# Patient Record
Sex: FEMALE | Race: WHITE | NOT HISPANIC OR LATINO | Employment: FULL TIME | URBAN - METROPOLITAN AREA
[De-identification: names, ages, dates, MRNs, and addresses within clinical notes are randomized per-mention and may not be internally consistent; named-entity substitution may affect disease eponyms.]

---

## 2017-08-22 ENCOUNTER — ALLSCRIPTS OFFICE VISIT (OUTPATIENT)
Dept: OTHER | Facility: OTHER | Age: 62
End: 2017-08-22

## 2017-08-22 DIAGNOSIS — Z01.419 ENCOUNTER FOR GYNECOLOGICAL EXAMINATION WITHOUT ABNORMAL FINDING: ICD-10-CM

## 2017-08-22 PROCEDURE — 87624 HPV HI-RISK TYP POOLED RSLT: CPT | Performed by: OBSTETRICS & GYNECOLOGY

## 2017-08-22 PROCEDURE — G0145 SCR C/V CYTO,THINLAYER,RESCR: HCPCS | Performed by: OBSTETRICS & GYNECOLOGY

## 2017-08-23 ENCOUNTER — LAB REQUISITION (OUTPATIENT)
Dept: LAB | Facility: HOSPITAL | Age: 62
End: 2017-08-23
Payer: COMMERCIAL

## 2017-08-23 DIAGNOSIS — Z01.419 ENCOUNTER FOR GYNECOLOGICAL EXAMINATION WITHOUT ABNORMAL FINDING: ICD-10-CM

## 2017-08-25 LAB — HPV RRNA GENITAL QL NAA+PROBE: NORMAL

## 2017-08-29 LAB
LAB AP GYN PRIMARY INTERPRETATION: NORMAL
Lab: NORMAL

## 2017-09-28 DIAGNOSIS — Z12.31 ENCOUNTER FOR SCREENING MAMMOGRAM FOR MALIGNANT NEOPLASM OF BREAST: ICD-10-CM

## 2017-12-01 ENCOUNTER — GENERIC CONVERSION - ENCOUNTER (OUTPATIENT)
Dept: OTHER | Facility: OTHER | Age: 62
End: 2017-12-01

## 2018-01-14 VITALS
BODY MASS INDEX: 32.92 KG/M2 | SYSTOLIC BLOOD PRESSURE: 120 MMHG | DIASTOLIC BLOOD PRESSURE: 70 MMHG | WEIGHT: 174.38 LBS | HEIGHT: 61 IN

## 2018-01-23 NOTE — MISCELLANEOUS
Message   Date: 01 Dec 2017 2:38 PM EST, Recorded By: Bartolo Nolan For: Asaf Edgar: Pankaj Malik, Self   Phone: [phone] [facility]), [phone] [id number] (Work)   Reason: Medical Complaint   Patient called c/o might be getting a UTI  Wants Rx just in case  Advised call PCP  Active Problems    1  Atrophy of vagina (627 3) (N95 2)   2  Breast self examination education, encounter for (V65 49) (Z71 89)   3  Cervical cancer screening (V76 2) (Z12 4)   4  Encounter for annual routine gynecological examination (V72 31) (Z01 419)   5  Encounter for routine gynecological examination with Papanicolaou smear of cervix   (V72 31,V76 2) (Z01 419)   6  Encounter for screening mammogram for malignant neoplasm of breast (V76 12)   (Z12 31)   7  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 (760 76) (Z91 89)   8  History of self breast exam   9  Osteoporosis (733 00) (M81 0)   10  Screening for human papillomavirus (HPV) (V73 81) (Z11 51)   11  Visit for screening mammogram (V76 12) (Z12 31)   12  Visit For: Single System Exam Gynecological (V72 31)   13  Vitamin D deficiency (268 9) (E55 9)    Current Meds   1  Abilify 5 MG Oral Tablet (ARIPiprazole); Therapy: [id number] to (Last Dave Bur)  Requested for: 23Apr2011 Ordered   2  Calcium 600/Vitamin D3 TABS; Therapy: (Recorded:04Nov2014) to Recorded   3  PROzac 20 MG Oral Capsule (FLUoxetine HCl); Therapy: (Recorded:[id number]) to Recorded   4  Vitamin D 97556 U CAPS; Therapy: (Recorded:04Nov2014) to Recorded    Allergies    1   Demerol SOLN    Signatures   Electronically signed by : Kristine Murry, ; Dec  1 2017  2:39PM EST                       (Author)

## 2018-09-11 ENCOUNTER — ANNUAL EXAM (OUTPATIENT)
Dept: OBGYN CLINIC | Facility: CLINIC | Age: 63
End: 2018-09-11
Payer: COMMERCIAL

## 2018-09-11 VITALS
SYSTOLIC BLOOD PRESSURE: 118 MMHG | DIASTOLIC BLOOD PRESSURE: 70 MMHG | WEIGHT: 181 LBS | BODY MASS INDEX: 33.31 KG/M2 | HEIGHT: 62 IN

## 2018-09-11 DIAGNOSIS — Z91.89 HISTORY OF DES EXPOSURE IN UTERO: ICD-10-CM

## 2018-09-11 DIAGNOSIS — Z01.419 WOMEN'S ANNUAL ROUTINE GYNECOLOGICAL EXAMINATION: ICD-10-CM

## 2018-09-11 DIAGNOSIS — Z12.31 VISIT FOR SCREENING MAMMOGRAM: ICD-10-CM

## 2018-09-11 DIAGNOSIS — M85.89 OSTEOPENIA OF MULTIPLE SITES: ICD-10-CM

## 2018-09-11 DIAGNOSIS — N95.2 ATROPHY OF VAGINA: ICD-10-CM

## 2018-09-11 DIAGNOSIS — Z11.51 SCREENING FOR HUMAN PAPILLOMAVIRUS (HPV): ICD-10-CM

## 2018-09-11 DIAGNOSIS — Z12.4 SCREENING FOR CERVICAL CANCER: Primary | ICD-10-CM

## 2018-09-11 PROCEDURE — 99396 PREV VISIT EST AGE 40-64: CPT | Performed by: OBSTETRICS & GYNECOLOGY

## 2018-09-11 PROCEDURE — 87624 HPV HI-RISK TYP POOLED RSLT: CPT | Performed by: OBSTETRICS & GYNECOLOGY

## 2018-09-11 PROCEDURE — G0145 SCR C/V CYTO,THINLAYER,RESCR: HCPCS | Performed by: OBSTETRICS & GYNECOLOGY

## 2018-09-11 RX ORDER — ZOLEDRONIC ACID 5 MG/100ML
5 INJECTION, SOLUTION INTRAVENOUS
COMMUNITY
Start: 2015-02-04

## 2018-09-11 RX ORDER — OMEPRAZOLE 20 MG/1
CAPSULE, DELAYED RELEASE ORAL
COMMUNITY
Start: 2018-08-11

## 2018-09-11 RX ORDER — HYALURONATE SODIUM 10 MG/ML
2 SYRINGE (ML) INTRAARTICULAR
COMMUNITY
End: 2018-10-02 | Stop reason: ALTCHOICE

## 2018-09-11 RX ORDER — DEXTROAMPHETAMINE SACCHARATE, AMPHETAMINE ASPARTATE, DEXTROAMPHETAMINE SULFATE AND AMPHETAMINE SULFATE 2.5; 2.5; 2.5; 2.5 MG/1; MG/1; MG/1; MG/1
TABLET ORAL
COMMUNITY
End: 2018-10-02 | Stop reason: SDUPTHER

## 2018-09-11 RX ORDER — FLUOXETINE HYDROCHLORIDE 40 MG/1
40 CAPSULE ORAL
COMMUNITY
Start: 2018-05-30

## 2018-09-11 RX ORDER — ARIPIPRAZOLE 1 MG/ML
SOLUTION ORAL
COMMUNITY

## 2018-09-11 NOTE — PROGRESS NOTES
Assessment/Plan:  1  Yearly exam-Pap smear done, self-breast awareness reviewed, calcium/vitamin-D recommendations discussed, mammogram request given, colonoscopy as per specialist   2   History of osteoporosis/osteopenia/vitamin-D deficiency-patient was previously treated with vitamin-D 32845 units weekly and is status post Reclast a few injections by her report  She is followed by Endocrinology at [address] [place] [address] scan September 2016 demonstrated osteopenia with T-score -2 4 at the femoral neck  She does have follow-up with endocrinology in the near future  Would suggest DEXA scan soon  3   History of 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-Pap smear was done today  Disposition as per findings  She does have a distant history of abnormal Pap smear but no recent history  4   Atrophic vagina-patient is without symptoms  5   Other-her daughter Elijah Suarez, former patient of mine is status post LEEP procedure for abnormal Pap smear in [place]  She has had follow-up with no residual findings  She is to get  in 2019  My congratulations were given  She will follow-up in 1 year or as needed  No problem-specific Assessment & Plan notes found for this encounter  Diagnoses and all orders for this visit:    Screening for cervical cancer  -     Liquid-based pap, screening    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  -     Liquid-based pap, screening    Screening for human papillomavirus (HPV)  -     Liquid-based pap, screening    Atrophy of vagina    Osteopenia of multiple sites    Women's annual routine gynecological examination    Visit for screening mammogram  -     Mammo screening bilateral w 3d & cad; Future    Other orders  -     ARIPiprazole (ABILIFY) 1 mg/mL oral solution; Abilify  -     amphetamine-dextroamphetamine (ADDERALL, 10MG,) 10 mg tablet;  Adderall  -     Calcium Carb-Cholecalciferol 600-800 MG-UNIT TABS; Take by mouth  -     Sodium Hyaluronate (EUFLEXXA) 20 MG/2ML SOSY; 2 mL  -     FLUoxetine (PROzac) 40 MG capsule; Take 40 mg by mouth  -     omeprazole (PriLOSEC) 20 mg delayed release capsule;   -     zoledronic acid (RECLAST) 5 mg/100 mL IV infusion (premix); Infuse 5 mg/100 mL into a venous catheter          Subjective:      Patient ID: Cruz Khan is a 66 y o  female  Patient was seen today for yearly exam   Please see assessment plan for details  The following portions of the patient's history were reviewed and updated as appropriate: allergies, current medications, past family history, past medical history, past social history, past surgical history and problem list     Review of Systems   Constitutional: Negative for chills, diaphoresis, fatigue and fever  Respiratory: Negative for apnea, cough, chest tightness, shortness of breath and wheezing  Cardiovascular: Negative for chest pain, palpitations and leg swelling  Gastrointestinal: Negative for abdominal distention, abdominal pain, anal bleeding, constipation, diarrhea, nausea, rectal pain and vomiting  Genitourinary: Negative for difficulty urinating, dyspareunia, dysuria, frequency, hematuria, menstrual problem, pelvic pain, urgency, vaginal bleeding, vaginal discharge and vaginal pain  Musculoskeletal: Negative for arthralgias, back pain and myalgias  Skin: Negative for color change and rash  Neurological: Negative for dizziness, syncope, light-headedness, numbness and headaches  Hematological: Negative for adenopathy  Does not bruise/bleed easily  Psychiatric/Behavioral: Negative for dysphoric mood and sleep disturbance  The patient is not nervous/anxious  Objective:      /70 (BP Location: Left arm, Patient Position: Sitting, Cuff Size: Standard)   Ht 5' 2" (1 575 m)   Wt 82 1 kg (181 lb)   Breastfeeding?  No   BMI 33 11 kg/m²          Physical Exam    Objective      /70 (BP Location: Left arm, Patient Position: Sitting, Cuff Size: Standard)   Ht 5' 2" (1 575 m)   Wt 82 1 kg (181 lb) Breastfeeding? No   BMI 33 11 kg/m²     General:   alert and oriented, in no acute distress, alert, appears stated age and cooperative   Neck: normal to inspection and palpation   Breast: normal appearance, no masses or tenderness   Heart:    Lungs:    Abdomen: soft, non-tender, without masses or organomegaly   Vulva: normal   Vagina: Mildly atrophic, without erythema or lesions or discharge  Normal   Cervix: Mildly atrophic, without lesions or discharge or cervicitis    No Cervical motion tenderness and normal   Uterus: top normal size, anteverted, non-tender   Adnexa: no mass, fullness, tenderness   Rectum: negative

## 2018-09-11 NOTE — PATIENT INSTRUCTIONS
Vaginal Atrophy   WHAT YOU NEED TO KNOW:   What is vaginal atrophy? Vaginal atrophy is a condition that causes thinning, drying, and inflammation of vaginal tissue  This condition is caused by decreased levels of estrogen (a female sex hormone)  Vaginal atrophy can increase your risk for vaginal and urinary tract infections  Vaginal atrophy can worsen over time if not treated  What causes or increases your risk of vaginal atrophy? · Menopause     · Medicines that lower your estrogen levels, such as those used to treat breast cancer, endometriosis, or fibroids    · Radiation to your pelvic area     · Surgery to remove the ovaries    · Breastfeeding  What are the signs and symptoms of vaginal atrophy? · Vaginal dryness, itching, and burning    · Vaginal discharge    · Pain or discomfort during sex    · Light bleeding after sex    · Burning during urination    · Frequent, sudden, strong urges to urinate    · Urinary incontinence (loss of control of your bladder)  How is vaginal atrophy diagnosed? Your healthcare provider will ask about your symptoms  A pelvic exam will be done to examine your vagina and cervix  Your healthcare provider will place a speculum into your vagina to open and examine it  A sample of discharge from your vagina may be collected and tested  A urine test may also be done  How is vaginal atrophy treated? · Over-the counter vaginal moisturizers  can help reduce dryness  Your healthcare provider may recommend that you use a vaginal moisturizer several times each week and during sex  Only use creams that are made for vaginal use  Do  not  use petroleum jelly  Lubricants can be used during sex to decrease pain and discomfort  · Estrogen  may help decrease dryness  It may also lower your risk of vaginal infections if you are going through menopause  It can also help to relieve urinary symptoms  Estrogen may be prescribed in the form of a cream, tablet, or ring   These medicines can be applied or inserted into the vagina  Estrogen can also be prescribed in the form of a pill  When should I contact my healthcare provider? · You have a foul-smelling odor coming from your vagina  · You have a thick, cheese-like discharge from your vagina  · You have itching, swelling, or redness in your vagina  · You have pain or burning when you urinate  · Your urine smells bad  · Your symptoms do not improve, or they get worse  · You have questions or concerns about your condition or care  CARE AGREEMENT:   You have the right to help plan your care  Learn about your health condition and how it may be treated  Discuss treatment options with your caregivers to decide what care you want to receive  You always have the right to refuse treatment  The above information is an  only  It is not intended as medical advice for individual conditions or treatments  Talk to your doctor, nurse or pharmacist before following any medical regimen to see if it is safe and effective for you  © 2017 [employer] Information is for End User's use only and may not be sold, redistributed or otherwise used for commercial purposes  All illustrations and images included in CareNotes® are the copyrighted property of A D A M , Inc  or Soham Lewis

## 2018-09-13 LAB
HPV HR 12 DNA CVX QL NAA+PROBE: NEGATIVE
HPV16 DNA CVX QL NAA+PROBE: NEGATIVE
HPV18 DNA CVX QL NAA+PROBE: NEGATIVE
LAB AP GYN PRIMARY INTERPRETATION: NORMAL
Lab: NORMAL

## 2018-09-14 ENCOUNTER — TELEPHONE (OUTPATIENT)
Dept: OBGYN CLINIC | Facility: CLINIC | Age: 63
End: 2018-09-14

## 2018-10-01 NOTE — PROGRESS NOTES
Assessment/Plan:    Class 1 obesity  -Discussed options of [facility]-Intensive Lifestyle Intervention Program, Very Low Calorie Diet-VLCD and Conservative Program and the role of weight loss medications   -Not a candidate for sympathomimetics as she is currently taking Adderall  -Can consider Contrave, which may also help with smoking cessation  -Initial weight loss goal of 5-10% weight loss for improved health  -Screening labs: Check A1c, insulin, TSH, LP, CMP  She will have labs done within the first two weeks of starting VLCD- within acceptable limits from 09/2017 from care everywhere  -Patient is interested in pursuing VLCD    Vitamin D deficiency  -managed by endocrinology  -due for repeat level    GERD (gastroesophageal reflux disease)  -controlled with PPI  -may improve with weight loss and dietary changes    Tobacco use  -Smoking around 1 cigarette per day, but not every day  -Risks reviewed  -Encouraged smoking cessation    Follow up for VLCD start    Goals:  Food log (ie ) www myfitnesspal com,sparkpeople  com,loseit com,calorieking  com,etc  baritastic  No sugary beverages  At least 80oz of water daily  No exercise while on VLCD    Diagnoses and all orders for this visit:    Abnormal weight gain  Comments:  -See plan as discussed under Class 1 Obesity  Orders:  -     Comprehensive metabolic panel; Future  -     Hemoglobin A1C; Future  -     Insulin, fasting; Future  -     Lipid panel; Future  -     TSH, 3rd generation with T4 reflex; Future    Class 1 obesity  -     Comprehensive metabolic panel; Future  -     Hemoglobin A1C; Future  -     Insulin, fasting; Future  -     Lipid panel; Future  -     TSH, 3rd generation with T4 reflex; Future    Gastroesophageal reflux disease, esophagitis presence not specified    Vitamin D deficiency    Tobacco use    IFG (impaired fasting glucose)  -     Comprehensive metabolic panel; Future  -     Hemoglobin A1C; Future  -     Insulin, fasting;  Future  -     Lipid panel; Future  -     TSH, 3rd generation with T4 reflex; Future    Other orders  -     ergocalciferol (VITAMIN D2) 50,000 units; Take 50,000 Units by mouth  -     amphetamine-dextroamphetamine (ADDERALL XR) 30 MG 24 hr capsule; Take 30 mg by mouth    Subjective:   Chief Complaint   Patient presents with    Consult     MWM consult      Patient ID: Kei Bello  is a 66 y o  female with excess weight/obesity here to pursue weight management  Past Medical History:   Diagnosis Date    Anxiety disorder     Atrophy of vagina     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     Osteoporosis     Reflux gastritis     Vitamin D deficiency      HPI:  Obesity/Excess Weight:  Severity: Mild  Onset:  Teens, worsened after pregnancies    Modifiers: Diet and Exercise, Commercial Weight Loss Programs-ie  Weight Watchers, Tamie Bihari, Nutrisystem, etc  and [employer]  Contributing factors: Pregnancy  Associated symptoms: fatigue and increased joint pain    Goals: 150  Hydration: 1 bottle of water per day  Drinks 3 cups coffee with sweet and low and creamer  Occasional soda  Alcohol: denies    The following portions of the patient's history were reviewed and updated as appropriate: allergies, current medications, past family history, past medical history, past social history, past surgical history and problem list     Review of Systems   Constitutional: Negative for chills and fever  HENT: Negative for sore throat  Respiratory: Negative for cough and shortness of breath  Cardiovascular: Negative for chest pain and palpitations  Gastrointestinal: Negative for abdominal pain, constipation, diarrhea, nausea and vomiting  GERD controlled with prilosec   Genitourinary: Negative for dysuria  Musculoskeletal: Positive for arthralgias and back pain  Skin: Negative for rash  Neurological: Negative for headaches     Psychiatric/Behavioral:        Denies sx of depression     Objective:    /80 (BP Location: Left arm, Patient Position: Sitting, Cuff Size: Adult)   Pulse 68   Temp 98 8 °F (37 1 °C) (Tympanic)   Resp 18   Ht 5' 1 5" (1 562 m)   Wt 81 5 kg (179 lb 9 6 oz)   BMI 33 39 kg/m²     Physical Exam   Nursing note and vitals reviewed  Constitutional   General appearance: Abnormal   well developed and obese  Eyes No conjunctival pallor  Ears, Nose, Mouth, and Throat Oral mucosa moist    Pulmonary   Respiratory effort: No increased work of breathing or signs of respiratory distress  Auscultation of lungs: Clear to auscultation, equal breath sounds bilaterally, no wheezes, no rales, no rhonci  Cardiovascular   Auscultation of heart: Normal rate and rhythm, normal S1 and S2, without murmurs  Examination of extremities for edema and/or varicosities: Normal   no edema  Abdomen   Abdomen: Abnormal   The abdomen was obese  Bowel sounds were normal  The abdomen was soft and nontender     Musculoskeletal   Gait and station: Normal     Psychiatric   Orientation to person, place and time: Normal     Affect: appropriate

## 2018-10-02 ENCOUNTER — OFFICE VISIT (OUTPATIENT)
Dept: BARIATRICS | Facility: CLINIC | Age: 63
End: 2018-10-02
Payer: COMMERCIAL

## 2018-10-02 VITALS
DIASTOLIC BLOOD PRESSURE: 80 MMHG | HEIGHT: 62 IN | WEIGHT: 179.6 LBS | TEMPERATURE: 98.8 F | BODY MASS INDEX: 33.05 KG/M2 | SYSTOLIC BLOOD PRESSURE: 118 MMHG | RESPIRATION RATE: 18 BRPM | HEART RATE: 68 BPM

## 2018-10-02 DIAGNOSIS — R73.01 IFG (IMPAIRED FASTING GLUCOSE): ICD-10-CM

## 2018-10-02 DIAGNOSIS — K21.9 GASTROESOPHAGEAL REFLUX DISEASE, ESOPHAGITIS PRESENCE NOT SPECIFIED: ICD-10-CM

## 2018-10-02 DIAGNOSIS — R63.5 ABNORMAL WEIGHT GAIN: Primary | ICD-10-CM

## 2018-10-02 DIAGNOSIS — E66.9 CLASS 1 OBESITY: ICD-10-CM

## 2018-10-02 DIAGNOSIS — E55.9 VITAMIN D DEFICIENCY: ICD-10-CM

## 2018-10-02 DIAGNOSIS — Z72.0 TOBACCO USE: ICD-10-CM

## 2018-10-02 PROBLEM — E66.811 CLASS 1 OBESITY: Status: ACTIVE | Noted: 2018-10-02

## 2018-10-02 PROCEDURE — 99203 OFFICE O/P NEW LOW 30 MIN: CPT | Performed by: PHYSICIAN ASSISTANT

## 2018-10-02 RX ORDER — ERGOCALCIFEROL 1.25 MG/1
50000 CAPSULE ORAL
COMMUNITY
Start: 2018-09-11

## 2018-10-02 RX ORDER — DEXTROAMPHETAMINE SACCHARATE, AMPHETAMINE ASPARTATE MONOHYDRATE, DEXTROAMPHETAMINE SULFATE AND AMPHETAMINE SULFATE 7.5; 7.5; 7.5; 7.5 MG/1; MG/1; MG/1; MG/1
30 CAPSULE, EXTENDED RELEASE ORAL
COMMUNITY
Start: 2018-09-20

## 2018-10-02 NOTE — ASSESSMENT & PLAN NOTE
-Smoking around 1 cigarette per day, but not every day  -Risks reviewed  -Encouraged smoking cessation

## 2018-10-02 NOTE — PATIENT INSTRUCTIONS
Goals: Food log (ie ) www myfitnesspal com,sparkpeople  com,loseit com,calorieking  com,etc  baritastic  No sugary beverages  At least 80oz of water daily    No exercise while on VLCD

## 2018-10-02 NOTE — ASSESSMENT & PLAN NOTE
-Discussed options of [facility]-Intensive Lifestyle Intervention Program, Very Low Calorie Diet-VLCD and Conservative Program and the role of weight loss medications   -Not a candidate for sympathomimetics as she is currently taking Adderall  -Can consider Contrave, which may also help with smoking cessation  -Initial weight loss goal of 5-10% weight loss for improved health  -Screening labs: Check A1c, insulin, TSH, LP, CMP   She will have labs done within the first two weeks of starting VLCD- within acceptable limits from 09/2017 from care everywhere  -Patient is interested in pursuing VLCD

## 2018-10-02 NOTE — PROGRESS NOTES
Initial RD session for VLCD completed  Components of diet discussed including ketosis, hydration, possible side effects  2 weeks of product ordered and received  Will f/u 10/5 via email

## 2018-10-03 ENCOUNTER — OFFICE VISIT (OUTPATIENT)
Dept: BARIATRICS | Facility: CLINIC | Age: 63
End: 2018-10-03

## 2018-10-03 VITALS — WEIGHT: 179.8 LBS | BODY MASS INDEX: 33.09 KG/M2 | HEIGHT: 62 IN

## 2018-10-03 DIAGNOSIS — R63.5 ABNORMAL WEIGHT GAIN: ICD-10-CM

## 2018-10-03 LAB — HBA1C MFR BLD HPLC: 5.3 %

## 2018-10-03 PROCEDURE — VLCD

## 2018-10-03 PROCEDURE — RECHECK

## 2018-10-04 ENCOUNTER — TELEPHONE (OUTPATIENT)
Dept: BARIATRICS | Facility: CLINIC | Age: 63
End: 2018-10-04

## 2018-10-04 NOTE — TELEPHONE ENCOUNTER
Please call the patient and let her know that the labs were within acceptable limits  Thank you!     Labs received from [facility]- to Be scanned to EPIC (CMP, A1c, insulin, TSH, LP)  A1c - 5 3 %  Insulin- 9 3  TSH- 1 53

## 2018-10-08 ENCOUNTER — PATIENT OUTREACH (OUTPATIENT)
Dept: BARIATRICS | Facility: CLINIC | Age: 63
End: 2018-10-08

## 2018-10-11 ENCOUNTER — PATIENT OUTREACH (OUTPATIENT)
Dept: BARIATRICS | Facility: CLINIC | Age: 63
End: 2018-10-11

## 2018-10-16 ENCOUNTER — OFFICE VISIT (OUTPATIENT)
Dept: BARIATRICS | Facility: CLINIC | Age: 63
End: 2018-10-16

## 2018-10-16 VITALS — BODY MASS INDEX: 31.47 KG/M2 | WEIGHT: 171 LBS | HEIGHT: 62 IN

## 2018-10-16 DIAGNOSIS — R63.5 ABNORMAL WEIGHT GAIN: ICD-10-CM

## 2018-10-16 PROCEDURE — RECHECK: Performed by: DIETITIAN, REGISTERED

## 2018-10-16 PROCEDURE — WMPRO12: Performed by: DIETITIAN, REGISTERED

## 2018-10-16 NOTE — PROGRESS NOTES
Weight Management Medical Nutrition Assessment  Brea Garcia presented for the New Start session with the [facility]   She recently completed 2 weeks of VLCD and today's weight is  171#  She lost 8 8# (5% TBW)in the past 2 weeks  She would like to transition to a low calorie diet using 1 meal replacement and 1 bar  Per dietary recall patient consumes excess calories from snacking on carbs between meals  She will often skip am meal and go long periods between meals  Anthropometric Measurements  Start Weight (lbs): 179 8#  Current Weight (lbs): 171#  TBW % Change from start weight:5%  Ideal Body Weight (lbs):107 5#  Goal Weight (lbs):ST#    Weight Loss History  Previous weight loss attempts: Self Created Diets (Portion Control, Healthy Food Choices, etc )  VLCD Diet    Food and Nutrition Related History      Food Recall  7:00am Wake up -coffee with flavored creamer - 12oz   11:00am 80 calorie yogurt      1:00pm Lunch:sandwich turkey  Snack:grapes / berries - unmeasured   Dinner:lean protein/ veggies/ carbs   Snack:ice cream     Beverages: water, diet snapple   Volume of beverage intake: used to not meet needs now 80oz     Weekends: Same  Cravings: sweets / carbs  Trouble area of day:night snacking     Frequency of Eating out: biweekly  Food restrictions:none  Cooking: self   Food Shopping: self    Physical Activity Intake  Activity:Swimming - 30-40 minutes  Frequency:2-3 x week   Physical limitations/barriers to exercise: none    Estimated Needs  Energy    [place] Energy Needs: BMR : 1286 calories    1# loss weekly sedentary:  1043             1# loss weekly lightly active:1268 calories  Protein:59-74gm      (1 2-1 5g/kg IBW)  Fluid: 57oz     (35mL/kg IBW)    Nutrition Diagnosis  Yes; Overweight/obesity  related to Excess energy intake as evidenced by  BMI more than normative standard for age and sex (obesity-grade I 26-30  9)       Nutrition Intervention    Nutrition Prescription  Calories:1000 calories on sedentary days and flex to 1200 calories on swim days  Protein:60-75gm daily   Fluid:60oz daily     Meal Plan  Breakfast:Shake  Snack:  Lunch:300/30/30  Snack: 150/5  Dinner:300/30/30  Snack:100-150    Nutrition Education:    Healthy Core Manual  Calorie controlled menu  Lean protein food choices  Healthy snack options  Food journaling tips      Nutrition Counseling:  Strategies: meal planning, portion sizes, healthy snack choices, hydration, fiber intake, protein intake, exercise, food journal      Monitoring and Evaluation:  Evaluation criteria:  Energy Intake  Meet protein needs  Maintain adequate hydration  Monitor weekly weight  Meal planning/preparation  Food journal   Decreased portions at mealtimes and snacks  Physical activity     Barriers to learning:none  Readiness to change: Action  Comprehension: good  Expected Compliance: good

## 2018-10-22 ENCOUNTER — TELEPHONE (OUTPATIENT)
Dept: OBGYN CLINIC | Facility: CLINIC | Age: 63
End: 2018-10-22

## 2018-10-25 ENCOUNTER — CLINICAL SUPPORT (OUTPATIENT)
Dept: BARIATRICS | Facility: CLINIC | Age: 63
End: 2018-10-25

## 2018-10-25 VITALS — HEIGHT: 62 IN | BODY MASS INDEX: 31.47 KG/M2 | WEIGHT: 171 LBS

## 2018-10-25 DIAGNOSIS — R63.5 ABNORMAL WEIGHT GAIN: Primary | ICD-10-CM

## 2018-10-25 PROCEDURE — RECHECK

## 2018-10-29 NOTE — PROGRESS NOTES
Weight Management Medical Nutrition Assessment  ST JENNIFER BARNES presented for her 2 week follow-up  session with the [facility]   Today's weight is 169 2#   She has lost 1 8# in the past week and overall has lost 10 6# (6% TBW) in the past month  She is food journaling and and averages 850-1000 calories  She has made many dietary changes including interval eating, reducing portion sizes at snacks and meals, and food journaling  Stated she occiasionally has Constipation and taking fiber gummies       Anthropometric Measurements  Start Weight (lbs): 179 8#  Current Weight (lbs):169 2 #  TBW % Change from start weight:6%  Ideal Body Weight (lbs):107 5#  Goal Weight (lbs):ST#     Weight Loss History  Previous weight loss attempts: Self Created Diets (Portion Control, Healthy Food Choices, etc )  [place] Diet     Food and Nutrition Related History        Food Recall  Meal Plan  Wake 7-8am   9-10am Breakfast:Shake or cheese stick   Coffee fat free /2 /   Snack:  Lunch:salad with protein/ sandwich with turkey   Snack:apple or bar   Dinner:4-6oz lean protein/ veggies  Snack:40 calorie fudge pop     Beverages: water, diet snapple   Volume of beverage intake: used to not meet needs now 40-60oz      Weekends: Same  Cravings: sweets / carbs  Trouble area of day:night snacking      Frequency of Eating out: biweekly  Food restrictions:none  Cooking: self   Food Shopping: self     Physical Activity Intake  Activity:Swimming - 30-40 minutes   Frequency:1x week   Physical limitations/barriers to exercise: none     Estimated Needs  Energy     [place] [place] Energy Needs: BMR :  1278 calories     1-2# loss weekly sedentary:533-1033 calories          1-2# loss weekly lightly active: 757-1257calories  Protein:59-74gm      (1 2-1 5g/kg IBW)  Fluid: 57oz     (35mL/kg IBW)     Nutrition Diagnosis  Yes;     Overweight/obesity  related to Excess energy intake as evidenced by  BMI more than normative standard for age and sex (obesity-grade I 30-34  9)     Nutrition Intervention     Nutrition Prescription  Calories: 800-1000 calories on sedentary days and flex to 1200 calories on swim days  Protein:60-75gm daily   Fluid:60oz daily      Meal Plan  Breakfast:Shake  Snack:  Lunch:300/30/30  Snack: 150/5  Dinner:300/30/30  Snack:100-150     Nutrition Education:    Healthy Core Manual  Calorie controlled menu  Lean protein food choices  Healthy snack options  Food journaling tips        Nutrition Counseling:  Strategies: meal planning, portion sizes, healthy snack choices, hydration, fiber intake, protein intake, exercise, food journal        Monitoring and Evaluation:  Evaluation criteria:  Energy Intake  Meet protein needs  Maintain adequate hydration  Monitor weekly weight  Meal planning/preparation  Food journal   Decreased portions at mealtimes and snacks  Physical activity      Barriers to learning:none  Readiness to change: Action  Comprehension: good  Expected Compliance: good

## 2018-10-30 ENCOUNTER — OFFICE VISIT (OUTPATIENT)
Dept: BARIATRICS | Facility: CLINIC | Age: 63
End: 2018-10-30

## 2018-10-30 VITALS — HEIGHT: 62 IN | BODY MASS INDEX: 31.14 KG/M2 | WEIGHT: 169.2 LBS

## 2018-10-30 DIAGNOSIS — R63.5 ABNORMAL WEIGHT GAIN: Primary | ICD-10-CM

## 2018-10-30 PROCEDURE — RECHECK: Performed by: DIETITIAN, REGISTERED

## 2018-11-01 ENCOUNTER — CLINICAL SUPPORT (OUTPATIENT)
Dept: BARIATRICS | Facility: CLINIC | Age: 63
End: 2018-11-01

## 2018-11-01 VITALS — HEIGHT: 62 IN | BODY MASS INDEX: 31.21 KG/M2 | WEIGHT: 169.6 LBS

## 2018-11-01 DIAGNOSIS — R63.5 ABNORMAL WEIGHT GAIN: Primary | ICD-10-CM

## 2018-11-01 PROCEDURE — RECHECK

## 2018-11-08 ENCOUNTER — CLINICAL SUPPORT (OUTPATIENT)
Dept: BARIATRICS | Facility: CLINIC | Age: 63
End: 2018-11-08

## 2018-11-08 VITALS — WEIGHT: 170.6 LBS | HEIGHT: 62 IN | BODY MASS INDEX: 31.39 KG/M2

## 2018-11-08 DIAGNOSIS — R63.5 ABNORMAL WEIGHT GAIN: Primary | ICD-10-CM

## 2018-11-08 PROCEDURE — RECHECK

## 2018-11-15 ENCOUNTER — CLINICAL SUPPORT (OUTPATIENT)
Dept: BARIATRICS | Facility: CLINIC | Age: 63
End: 2018-11-15

## 2018-11-15 VITALS — HEIGHT: 62 IN | WEIGHT: 169.6 LBS | BODY MASS INDEX: 31.21 KG/M2

## 2018-11-15 DIAGNOSIS — R63.5 ABNORMAL WEIGHT GAIN: Primary | ICD-10-CM

## 2018-11-15 PROCEDURE — RECHECK

## 2018-11-29 ENCOUNTER — CLINICAL SUPPORT (OUTPATIENT)
Dept: BARIATRICS | Facility: CLINIC | Age: 63
End: 2018-11-29

## 2018-11-29 VITALS — HEIGHT: 62 IN | BODY MASS INDEX: 31.39 KG/M2 | WEIGHT: 170.6 LBS

## 2018-11-29 DIAGNOSIS — R63.5 ABNORMAL WEIGHT GAIN: Primary | ICD-10-CM

## 2018-11-29 PROCEDURE — RECHECK

## 2019-05-16 ENCOUNTER — TELEPHONE (OUTPATIENT)
Dept: OBGYN CLINIC | Facility: CLINIC | Age: 64
End: 2019-05-16

## 2019-05-16 ENCOUNTER — OFFICE VISIT (OUTPATIENT)
Dept: OBGYN CLINIC | Facility: CLINIC | Age: 64
End: 2019-05-16
Payer: COMMERCIAL

## 2019-05-16 VITALS
HEIGHT: 62 IN | DIASTOLIC BLOOD PRESSURE: 80 MMHG | SYSTOLIC BLOOD PRESSURE: 126 MMHG | WEIGHT: 179.6 LBS | BODY MASS INDEX: 33.05 KG/M2

## 2019-05-16 DIAGNOSIS — B37.3 VULVAR CANDIDIASIS: Primary | ICD-10-CM

## 2019-05-16 PROCEDURE — 99213 OFFICE O/P EST LOW 20 MIN: CPT | Performed by: OBSTETRICS & GYNECOLOGY

## 2019-05-16 RX ORDER — CLOTRIMAZOLE AND BETAMETHASONE DIPROPIONATE 10; .64 MG/G; MG/G
CREAM TOPICAL 2 TIMES DAILY
Qty: 30 G | Refills: 0 | Status: SHIPPED | OUTPATIENT
Start: 2019-05-16 | End: 2021-10-07

## 2021-10-07 ENCOUNTER — ANNUAL EXAM (OUTPATIENT)
Dept: OBGYN CLINIC | Facility: CLINIC | Age: 66
End: 2021-10-07
Payer: COMMERCIAL

## 2021-10-07 VITALS — DIASTOLIC BLOOD PRESSURE: 90 MMHG | WEIGHT: 170 LBS | SYSTOLIC BLOOD PRESSURE: 140 MMHG | BODY MASS INDEX: 31.6 KG/M2

## 2021-10-07 DIAGNOSIS — M85.89 OSTEOPENIA OF MULTIPLE SITES: ICD-10-CM

## 2021-10-07 DIAGNOSIS — Z91.89 HISTORY OF DES EXPOSURE IN UTERO: ICD-10-CM

## 2021-10-07 DIAGNOSIS — N95.2 ATROPHY OF VAGINA: ICD-10-CM

## 2021-10-07 DIAGNOSIS — Z12.31 ENCOUNTER FOR SCREENING MAMMOGRAM FOR BREAST CANCER: ICD-10-CM

## 2021-10-07 DIAGNOSIS — Z01.419 WOMEN'S ANNUAL ROUTINE GYNECOLOGICAL EXAMINATION: Primary | ICD-10-CM

## 2021-10-07 DIAGNOSIS — M81.0 OSTEOPOROSIS, UNSPECIFIED OSTEOPOROSIS TYPE, UNSPECIFIED PATHOLOGICAL FRACTURE PRESENCE: ICD-10-CM

## 2021-10-07 PROCEDURE — G0476 HPV COMBO ASSAY CA SCREEN: HCPCS | Performed by: OBSTETRICS & GYNECOLOGY

## 2021-10-07 PROCEDURE — 99396 PREV VISIT EST AGE 40-64: CPT | Performed by: OBSTETRICS & GYNECOLOGY

## 2021-10-07 PROCEDURE — G0145 SCR C/V CYTO,THINLAYER,RESCR: HCPCS | Performed by: OBSTETRICS & GYNECOLOGY

## 2021-10-07 RX ORDER — TOPIRAMATE 25 MG/1
25 CAPSULE, COATED PELLETS ORAL 2 TIMES DAILY
COMMUNITY
Start: 2021-09-20

## 2021-10-08 LAB
HPV HR 12 DNA CVX QL NAA+PROBE: NEGATIVE
HPV16 DNA CVX QL NAA+PROBE: NEGATIVE
HPV18 DNA CVX QL NAA+PROBE: NEGATIVE

## 2021-10-12 LAB
LAB AP GYN PRIMARY INTERPRETATION: NORMAL
Lab: NORMAL

## 2021-12-03 ENCOUNTER — EVALUATION (OUTPATIENT)
Dept: PHYSICAL THERAPY | Facility: MEDICAL CENTER | Age: 66
End: 2021-12-03
Payer: COMMERCIAL

## 2021-12-03 DIAGNOSIS — M25.562 ACUTE PAIN OF LEFT KNEE: Primary | ICD-10-CM

## 2021-12-03 PROCEDURE — 97161 PT EVAL LOW COMPLEX 20 MIN: CPT | Performed by: PHYSICAL THERAPIST

## 2022-10-13 ENCOUNTER — ANNUAL EXAM (OUTPATIENT)
Dept: GYNECOLOGY | Facility: CLINIC | Age: 67
End: 2022-10-13
Payer: MEDICARE

## 2022-10-13 VITALS
BODY MASS INDEX: 31.58 KG/M2 | DIASTOLIC BLOOD PRESSURE: 84 MMHG | WEIGHT: 171.6 LBS | HEIGHT: 62 IN | SYSTOLIC BLOOD PRESSURE: 120 MMHG

## 2022-10-13 DIAGNOSIS — Z01.419 WOMEN'S ANNUAL ROUTINE GYNECOLOGICAL EXAMINATION: Primary | ICD-10-CM

## 2022-10-13 DIAGNOSIS — N95.2 ATROPHY OF VAGINA: ICD-10-CM

## 2022-10-13 DIAGNOSIS — Z12.31 ENCOUNTER FOR SCREENING MAMMOGRAM FOR BREAST CANCER: ICD-10-CM

## 2022-10-13 DIAGNOSIS — M81.0 OSTEOPOROSIS, UNSPECIFIED OSTEOPOROSIS TYPE, UNSPECIFIED PATHOLOGICAL FRACTURE PRESENCE: ICD-10-CM

## 2022-10-13 DIAGNOSIS — Z91.89 HISTORY OF DES EXPOSURE IN UTERO: ICD-10-CM

## 2022-10-13 PROCEDURE — G0145 SCR C/V CYTO,THINLAYER,RESCR: HCPCS | Performed by: OBSTETRICS & GYNECOLOGY

## 2022-10-13 PROCEDURE — G0101 CA SCREEN;PELVIC/BREAST EXAM: HCPCS | Performed by: OBSTETRICS & GYNECOLOGY

## 2022-10-13 RX ORDER — CEPHALEXIN 500 MG/1
CAPSULE ORAL
COMMUNITY
Start: 2022-08-31

## 2022-10-13 RX ORDER — LIDOCAINE HYDROCHLORIDE 10 MG/ML
3 INJECTION, SOLUTION INFILTRATION; PERINEURAL
COMMUNITY

## 2022-10-13 RX ORDER — GABAPENTIN 300 MG/1
300 CAPSULE ORAL 2 TIMES DAILY
COMMUNITY
Start: 2022-09-18

## 2022-10-13 RX ORDER — BETAMETHASONE SODIUM PHOSPHATE AND BETAMETHASONE ACETATE 3; 3 MG/ML; MG/ML
1 INJECTION, SUSPENSION INTRA-ARTICULAR; INTRALESIONAL; INTRAMUSCULAR; SOFT TISSUE
COMMUNITY

## 2022-10-13 RX ORDER — ARIPIPRAZOLE 5 MG/1
5 TABLET ORAL DAILY
COMMUNITY
Start: 2022-09-29

## 2022-10-13 RX ORDER — PANTOPRAZOLE SODIUM 40 MG/1
TABLET, DELAYED RELEASE ORAL
COMMUNITY
Start: 2022-03-08

## 2022-10-13 RX ORDER — TRIAMCINOLONE ACETONIDE 32 MG
32 SUSPENSION,EXTENDED RELEASE VIAL (EA) INTRAARTICULAR
COMMUNITY

## 2022-10-13 RX ORDER — PREDNISONE 10 MG/1
TABLET ORAL
COMMUNITY
Start: 2022-09-02

## 2022-10-13 RX ORDER — CLINDAMYCIN PHOSPHATE 10 UG/ML
LOTION TOPICAL
COMMUNITY
Start: 2022-07-19

## 2022-10-13 NOTE — PROGRESS NOTES
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    Osteoporosis, unspecified osteoporosis type, unspecified pathological fracture presence    Other orders  -     betamethasone acetate-betamethasone sodium phosphate (CELESTONE) 6 (3-3) mg/mL; 1 mL  -     cephalexin (KEFLEX) 500 mg capsule; TAKE 1 CAPSULE BY MOUTH 2 TIMES A DAY FOR 5 DAYS  -     clindamycin (CLEOCIN T) 1 % lotion; APPLY A THIN LAYER TO THE AFFECTED AREAS TWICE A DAY  -     Diclofenac Sodium (VOLTAREN) 1 %; apply 2 grams topically to affected area four times a day  -     gabapentin (NEURONTIN) 300 mg capsule; Take 300 mg by mouth 2 (two) times a day  -     lidocaine (XYLOCAINE) 1 %; 3 mL  -     nirmatrelvir & ritonavir (Paxlovid) tablet therapy pack; take 2 NIRMATRELVIR tablets with 1 RITONAVIR tablet twice a day for 5 days  -     pantoprazole (PROTONIX) 40 mg tablet; pantoprazole 40 mg tablet,delayed release   take 1 tablet by mouth once daily  -     predniSONE 10 mg tablet; take 4 tablet by mouth once daily for 7 days then take 3 tablet b   (REFER TO PRESCRIPTION NOTES)  -     triamcinolone acetonide (Zilretta) 32 MG; 32 mL  -     ARIPiprazole (ABILIFY) 5 mg tablet; Take 5 mg by mouth daily    1  Yearly exam-Pap smear done with reflex HPV, self-breast awareness reviewed, calcium/vitamin-D recommendations discussed, mammogram request given, colonoscopy done 2-3 years ago with Dr Alejandra Alvarenga, follow-up as per recommendation  2  Vaginal atrophy-mild changes noted on exam   She uses lubrication with good results  Vaginal lubrication/moisturizer sheet given  3  History of NORA-Pap smear done with reflex HPV  Disposition accordingly    4  History of osteoporosis-most recent DEXA scan was 10/21/20, lumbar spine T-score-1 2, femoral neck-2 5  She continues with Prolia injections as per Dr Jose Oneal of endocrinology from [facility]  She will have follow-up DEXA scan as recommended by them  5  Right knee-for right knee replacement 22  My best wishes were given  6  Other-daughter, patient of mine formally, now lives outside [place]  She just recently retired  Her  retired prior to that  My congratulations were given  To follow-up 1 year for follow-up visit or as needed  Subjective   Patient ID: Helena Avilez is a 74 y o  female  Vitals:    10/13/22 0925   BP: 120/84     Patient was seen today for yearly exam   Please see assessment plan for details        The following portions of the patient's history were reviewed and updated as appropriate: allergies, current medications, past family history, past medical history, past social history, past surgical history and problem list   Past Medical History:   Diagnosis Date   • Abnormal Pap smear of cervix    • Anxiety disorder    • Atrophy of vagina    • 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    • Osteoporosis    • Reflux gastritis    • Urinary tract infection    • Vitamin D deficiency      Past Surgical History:   Procedure Laterality Date   • CERVICAL CERCLAGE      with both pregnancies   • COLPOSCOPY  2005   • ENDOMETRIAL BIOPSY     • HALLUX VALGUS CORRECTION     • HAND SURGERY     • INDUCED      • LAPAROSCOPY      for infertility evaluation   • REDUCTION MAMMAPLASTY     • WISDOM TOOTH EXTRACTION       OB History    Para Term  AB Living             2   SAB IAB Ectopic Multiple Live Births                   Current Outpatient Medications:   •  amphetamine-dextroamphetamine (ADDERALL XR) 30 MG 24 hr capsule, Take 30 mg by mouth, Disp: , Rfl:   •  ARIPiprazole (ABILIFY) 1 mg/mL oral solution, Abilify, Disp: , Rfl:   •  ARIPiprazole (ABILIFY) 5 mg tablet, Take 5 mg by mouth daily, Disp: , Rfl:   •  betamethasone acetate-betamethasone sodium phosphate (CELESTONE) 6 (3-3) mg/mL, 1 mL, Disp: , Rfl:   •  cephalexin (KEFLEX) 500 mg capsule, TAKE 1 CAPSULE BY MOUTH 2 TIMES A DAY FOR 5 DAYS , Disp: , Rfl:   •  clindamycin (CLEOCIN T) 1 % lotion, APPLY A THIN LAYER TO THE AFFECTED AREAS TWICE A DAY, Disp: , Rfl:   •  Diclofenac Sodium (VOLTAREN) 1 %, apply 2 grams topically to affected area four times a day, Disp: , Rfl:   •  ergocalciferol (VITAMIN D2) 50,000 units, Take 50,000 Units by mouth, Disp: , Rfl:   •  FLUoxetine (PROzac) 40 MG capsule, Take 40 mg by mouth, Disp: , Rfl:   •  gabapentin (NEURONTIN) 300 mg capsule, Take 300 mg by mouth 2 (two) times a day, Disp: , Rfl:   •  Influenza Virus Vacc Split PF 0 5 ML SRIDEVI Fluvirin 0029-2190(PF) 45 mcg (15 mcg x3)/0 5 mL intramuscular syringe  inject 0 5 milliliter intramuscularly, Disp: , Rfl:   •  lidocaine (XYLOCAINE) 1 %, 3 mL, Disp: , Rfl:   •  nirmatrelvir & ritonavir (Paxlovid) tablet therapy pack, take 2 NIRMATRELVIR tablets with 1 RITONAVIR tablet twice a day for 5 days, Disp: , Rfl:   •  pantoprazole (PROTONIX) 40 mg tablet, pantoprazole 40 mg tablet,delayed release  take 1 tablet by mouth once daily, Disp: , Rfl:   •  predniSONE 10 mg tablet, take 4 tablet by mouth once daily for 7 days then take 3 tablet b   (REFER TO PRESCRIPTION NOTES)  , Disp: , Rfl:   •  triamcinolone acetonide (Zilretta) 32 MG, 32 mL, Disp: , Rfl:   •  Calcium Carb-Cholecalciferol 600-800 MG-UNIT TABS, Take by mouth (Patient not taking: Reported on 10/7/2021), Disp: , Rfl:   •  clotrimazole-betamethasone (LOTRISONE) 1-0 05 % cream, Apply topically 2 (two) times a day for 7 days, Disp: 30 g, Rfl: 0  •  omeprazole (PriLOSEC) 20 mg delayed release capsule, , Disp: , Rfl:   •  topiramate (TOPAMAX) 25 mg sprinkle capsule, Take 25 mg by mouth 2 (two) times a day, Disp: , Rfl:   •  zoledronic acid (RECLAST) 5 mg/100 mL IV infusion (premix), Infuse 5 mg/100 mL into a venous catheter (Patient not taking: Reported on 10/7/2021), Disp: , Rfl: Allergies   Allergen Reactions   • Demerol [Meperidine] Other (See Comments)     hypotension     Social History     Socioeconomic History   • Marital status: /Civil Union     Spouse name: None   • Number of children: None   • Years of education: None   • Highest education level: None   Occupational History   • None   Tobacco Use   • Smoking status: Former Smoker     Packs/day: 0 25     Types: Cigarettes   • Smokeless tobacco: Never Used   Vaping Use   • Vaping Use: Never used   Substance and Sexual Activity   • Alcohol use: No   • Drug use: No   • Sexual activity: Yes     Partners: Male   Other Topics Concern   • None   Social History Narrative   • None     Social Determinants of Health     Financial Resource Strain: Not on file   Food Insecurity: Not on file   Transportation Needs: Not on file   Physical Activity: Not on file   Stress: Not on file   Social Connections: Not on file   Intimate Partner Violence: Not on file   Housing Stability: Not on file     Family History   Problem Relation Age of Onset   • Hypertension Mother    • Lung cancer Father    • Diabetes Neg Hx    • Heart disease Neg Hx    • Stroke Neg Hx    • Thyroid disease Neg Hx        Review of Systems   Constitutional: Negative for chills, diaphoresis, fatigue and fever  Respiratory: Negative for apnea, cough, chest tightness, shortness of breath and wheezing  Cardiovascular: Negative for chest pain, palpitations and leg swelling  Gastrointestinal: Negative for abdominal distention, abdominal pain, anal bleeding, constipation, diarrhea, nausea, rectal pain and vomiting  Genitourinary: Negative for difficulty urinating, dyspareunia, dysuria, frequency, hematuria, menstrual problem, pelvic pain, urgency, vaginal bleeding, vaginal discharge and vaginal pain  Musculoskeletal: Negative for arthralgias, back pain and myalgias  Skin: Negative for color change and rash     Neurological: Negative for dizziness, syncope, light-headedness, numbness and headaches  Hematological: Negative for adenopathy  Does not bruise/bleed easily  Psychiatric/Behavioral: Negative for dysphoric mood and sleep disturbance  The patient is not nervous/anxious  Objective   Physical Exam  OBGyn Exam     Objective      /84 (BP Location: Left arm, Patient Position: Sitting)   Ht 5' 1 5" (1 562 m)   Wt 77 8 kg (171 lb 9 6 oz)   BMI 31 90 kg/m²     General:   alert and oriented, in no acute distress   Neck: normal to inspection and palpation   Breast: normal appearance, no masses or tenderness   Heart:    Lungs:    Abdomen: soft, non-tender, without masses or organomegaly   Vulva: normal   Vagina: Mildly atrophic, without erythema or lesions or   Cervix: Mildly atrophic, without lesions or discharge or cervicitis    No Cervical motion tenderness   Uterus: top normal size, anteverted, non-tender   Adnexa: no mass, fullness, tenderness   Rectum: negative    Psych:  Normal mood and affect   Skin:  Without obvious lesions   Eyes: symmetric, with normal movements and reactivity   Musculoskeletal:  Normal muscle tone and movements appreciated

## 2022-10-20 LAB
LAB AP GYN PRIMARY INTERPRETATION: NORMAL
Lab: NORMAL

## 2022-12-28 ENCOUNTER — EVALUATION (OUTPATIENT)
Dept: PHYSICAL THERAPY | Facility: MEDICAL CENTER | Age: 67
End: 2022-12-28

## 2022-12-28 DIAGNOSIS — Z96.651 TOTAL KNEE REPLACEMENT STATUS, RIGHT: Primary | ICD-10-CM

## 2022-12-28 NOTE — LETTER
2022    Gentry Roberts MD  [address] [facility]   [facility]    Patient: Guillermo Yu   YOB: 1955   Date of Visit: 2022     Encounter Diagnosis     ICD-10-CM    1  Total knee replacement status, right  Z96 651           Dear Dr Peñaloza Sport: Thank you for your recent referral of Guillermo Yu  Please review the attached evaluation summary from [address] [place] [address] recent visit  Please verify that you agree with the plan of care by signing the attached order  If you have any questions or concerns, please do not hesitate to call  I sincerely appreciate the opportunity to share in the care of one of your patients and hope to have another opportunity to work with you in the near future  Sincerely,    Migue High, PT      Referring Provider:      I certify that I have read the below Plan of Care and certify the need for these services furnished under this plan of treatment while under my care  Gentry Roberts MD  [address] [facility]  [address] [facility] [address]  Via Fax: [phone]          PT Evaluation     Today's date: 2022  Patient name: Guillermo Yu  : 1955  MRN: [id number]  Referring provider: Colleen Dubon MD  Dx:   Encounter Diagnosis     ICD-10-CM    1  Total knee replacement status, right  Z96 651                      Assessment  Assessment details: Guillermo Yu is a 74 y o  female was evaluated on 2022  for Total knee replacement status, right  (primary encounter diagnosis)  Guillermo Yu has the above listed impairments resulting in functional deficits and negative impact to quality of life  Patient is appropriate for skilled PT intervention to promote maximal return to function and patient specific goals  Patient agrees with outlined treatment plan and all questions were answered to their satisfaction        Impairments: abnormal muscle firing, abnormal muscle tone, abnormal or restricted ROM, impaired physical strength, lacks appropriate home exercise program and pain with function  Understanding of Dx/Px/POC: good   Prognosis: good    Goals  Patient will successfully transition to home exercise program   Patient will be able to manage symptoms independently  Zuly Diop will return to swimming  Zuly Diop will report no limitation in walking tolerance or ability     Plan  Patient would benefit from: skilled PT  Referral necessary: No  Planned modality interventions: thermotherapy: hydrocollator packs  Planned therapy interventions: home exercise program, manual therapy, neuromuscular re-education, patient education, functional ROM exercises, strengthening, stretching, joint mobilization, graded activity, graded exercise, therapeutic exercise, body mechanics training, motor coordination training and activity modification  Frequency: 1x week  Duration in weeks: 12  Treatment plan discussed with: patient        Subjective Evaluation    History of Present Illness  Mechanism of injury: Alaina Broussard is a 74 y o  female presenting to therapy s/p R TKA performed   She had a return to hospital due to dyspnea and decreased O2 sats to 82%  Currently discharged from hospital after 5 days and on supplemental oxygen as needed  She reports doing well and has been doing some light mobility on her knee at home  Sofia Acuña to return to walking and swimming  Pain  Current pain rating: 3  At best pain ratin  At worst pain ratin  Quality: dull ache, discomfort, tight and pulling    Patient Goals  Patient goals for therapy: decreased pain, increased motion, return to sport/leisure activities, independence with ADLs/IADLs and increased strength          Objective     Observations     Right Knee   Positive for incision  Negative for drainage       Additional Observation Details  Continued sab    Passive Range of Motion     Right Knee   Flexion: 105 degrees   Extension: 0 degrees     Mobility   Patellar Mobility:     Right Knee   Hypomobile: superior and inferior     Strength/Myotome Testing     Left Hip   Planes of Motion   Flexion: 4  Extension: 4  Abduction: 4  Adduction: 4    Right Hip   Planes of Motion   Flexion: 4  Extension: 4  Abduction: 4  Adduction: 4    Left Knee   Flexion: 4+  Prone flexion: 4+  Extension: 4+    Right Knee   Flexion: 4  Prone flexion: 4  Extension: 4            Precautions: Dyspnea (monitor O2)       Manuals 12/28                                                                Neuro Re-Ed                                                                                                        Ther Ex             Heel slides             Hamstring stretch             Quad sets             Gastroc stretch                                                                 Ther Activity                                       Gait Training                                       Modalities

## 2022-12-28 NOTE — PROGRESS NOTES
PT Evaluation     Today's date: 2022  Patient name: Lake Goldberg  : 1955  MRN: [id number]  Referring provider: Santos Dobbs MD  Dx:   Encounter Diagnosis     ICD-10-CM    1  Total knee replacement status, right  Z96 651                      Assessment  Assessment details: Lake Goldberg is a 74 y o  female was evaluated on 2022  for Total knee replacement status, right  (primary encounter diagnosis)  Lake Goldberg has the above listed impairments resulting in functional deficits and negative impact to quality of life  Patient is appropriate for skilled PT intervention to promote maximal return to function and patient specific goals  Patient agrees with outlined treatment plan and all questions were answered to their satisfaction  Impairments: abnormal muscle firing, abnormal muscle tone, abnormal or restricted ROM, impaired physical strength, lacks appropriate home exercise program and pain with function  Understanding of Dx/Px/POC: good   Prognosis: good    Goals  Patient will successfully transition to home exercise program   Patient will be able to manage symptoms independently  House[employer] Jobs will return to [employer] will report no limitation in walking tolerance or ability     Plan  Patient would benefit from: skilled PT  Referral necessary: No  Planned modality interventions: thermotherapy: hydrocollator packs  Planned therapy interventions: home exercise program, manual therapy, neuromuscular re-education, patient education, functional ROM exercises, strengthening, stretching, joint mobilization, graded activity, graded exercise, therapeutic exercise, body mechanics training, motor coordination training and activity modification  Frequency: 1x week  Duration in weeks: 12  Treatment plan discussed with: patient        Subjective Evaluation    History of Present Illness  Mechanism of injury: Lake Goldberg is a 74 y o  female presenting to therapy s/p R TKA performed     She had a return to hospital due to dyspnea and decreased O2 sats to 82%  Currently discharged from hospital after 5 days and on supplemental oxygen as needed  She reports doing well and has been doing some light mobility on her knee at home  Jeffconstantino Acuña to return to walking and swimming  Pain  Current pain rating: 3  At best pain ratin  At worst pain ratin  Quality: dull ache, discomfort, tight and pulling    Patient Goals  Patient goals for therapy: decreased pain, increased motion, return to sport/leisure activities, independence with ADLs/IADLs and increased strength          Objective     Observations     Right Knee   Positive for incision  Negative for drainage       Additional Observation Details  Continued sab    Passive Range of Motion     Right Knee   Flexion: 105 degrees   Extension: 0 degrees     Mobility   Patellar Mobility:     Right Knee   Hypomobile: superior and inferior     Strength/Myotome Testing     Left Hip   Planes of Motion   Flexion: 4  Extension: 4  Abduction: 4  Adduction: 4    Right Hip   Planes of Motion   Flexion: 4  Extension: 4  Abduction: 4  Adduction: 4    Left Knee   Flexion: 4+  Prone flexion: 4+  Extension: 4+    Right Knee   Flexion: 4  Prone flexion: 4  Extension: 4             Precautions: Dyspnea (monitor O2)       Manuals                                                                 Neuro Re-Ed                                                                                                        Ther Ex             Heel slides             Hamstring stretch             Quad sets             Gastroc stretch                                                                 Ther Activity                                       Gait Training                                       Modalities

## 2023-01-03 ENCOUNTER — OFFICE VISIT (OUTPATIENT)
Dept: PHYSICAL THERAPY | Facility: MEDICAL CENTER | Age: 68
End: 2023-01-03

## 2023-01-03 DIAGNOSIS — Z96.651 TOTAL KNEE REPLACEMENT STATUS, RIGHT: Primary | ICD-10-CM

## 2023-01-04 NOTE — PROGRESS NOTES
Daily Note     Today's date: 2023  Patient name: Therese Cantrell  : 1955  MRN: [id number]  Referring provider: Carlos Phelan MD  Dx:   Encounter Diagnosis     ICD-10-CM    1  Total knee replacement status, right  Z96 651                      Subjective: Jayme Duncan reports that she is doing well walking but still just having incisional pain  Has been in contact with MD and adjusted medications       Objective: See treatment diary below      Assessment: Tolerated treatment well  Patient has very good mobilty and gait, incision is umremarkable with adequate mobility upon assessment  Began bike today with good tolerance       Plan: Continue per plan of care        Precautions: Dyspnea (monitor O2)       Manuals 1/3            :Patellar mobility/scar mobility  AF                                                    Neuro Re-Ed                                                                                                        Ther Ex             Heel slides 30            Hamstring stretch             Quad sets 30            Gastroc stretch             Bike 5 min                                                    Ther Activity                                       Gait Training                                       Modalities

## 2023-01-06 ENCOUNTER — OFFICE VISIT (OUTPATIENT)
Dept: PHYSICAL THERAPY | Facility: MEDICAL CENTER | Age: 68
End: 2023-01-06

## 2023-01-06 DIAGNOSIS — Z96.651 TOTAL KNEE REPLACEMENT STATUS, RIGHT: Primary | ICD-10-CM

## 2023-01-06 NOTE — PROGRESS NOTES
Daily Note     Today's date: 2023  Patient name: Therese Cantrell  : 1955  MRN: [id number]  Referring provider: Carlos Phelan MD  Dx:   Encounter Diagnosis     ICD-10-CM    1  Total knee replacement status, right  Z96 651                      Subjective: Jayme Duncan reports that she is doing well walking but still just having incisional pain  Has been in contact with MD and adjusted medications       Objective: See treatment diary below      Assessment: Tolerated treatment well  Patient reports doing better with regard to pain and stiffness but still having trouble sleeping due to pain complaints  She will be seeing MD next week      Plan: Continue per plan of care        Precautions: Dyspnea (monitor O2)       Manuals             :Patellar mobility/scar mobility  AF                                                    Neuro Re-Ed                                                                                                        Ther Ex             Heel slides 30            Hamstring stretch             Quad sets 30            Gastroc stretch             Bike 10 min                                                    Ther Activity                                       Gait Training                                       Modalities

## 2023-01-09 ENCOUNTER — APPOINTMENT (OUTPATIENT)
Dept: PHYSICAL THERAPY | Facility: MEDICAL CENTER | Age: 68
End: 2023-01-09

## 2023-01-10 ENCOUNTER — OFFICE VISIT (OUTPATIENT)
Dept: PHYSICAL THERAPY | Facility: MEDICAL CENTER | Age: 68
End: 2023-01-10

## 2023-01-10 DIAGNOSIS — Z96.651 TOTAL KNEE REPLACEMENT STATUS, RIGHT: Primary | ICD-10-CM

## 2023-01-10 NOTE — PROGRESS NOTES
Daily Note     Today's date: 1/10/2023  Patient name: Yandy Avila  : 1955  MRN: [id number]  Referring provider: Candance Rider, MD  Dx:   Encounter Diagnosis     ICD-10-CM    1  Total knee replacement status, right  Z96 651                      Subjective: Trinidad Turner reports that she is doing well and noticing pain is reducing gradually     Objective: See treatment diary below      Assessment: Tolerated treatment well  Patient reports doing better with regard to pain and stiffness but still having trouble sleeping due to pain complaints  She will be seeing MD next week      Plan: Continue per plan of care        Precautions: Dyspnea (monitor O2)       Manuals 1/10            :Patellar mobility/scar mobility  AF                                                    Neuro Re-Ed                                                                                                        Ther Ex             Heel slides 30            Hamstring stretch             Quad sets 30            Gastroc stretch             Bike 10 min                                                    Ther Activity                                       Gait Training                                       Modalities

## 2023-01-13 ENCOUNTER — OFFICE VISIT (OUTPATIENT)
Dept: PHYSICAL THERAPY | Facility: MEDICAL CENTER | Age: 68
End: 2023-01-13

## 2023-01-13 DIAGNOSIS — Z96.651 TOTAL KNEE REPLACEMENT STATUS, RIGHT: Primary | ICD-10-CM

## 2023-01-16 NOTE — PROGRESS NOTES
Daily Note     Today's date: 2023  Patient name: Lainey Reynolds  : 1955  MRN: [id number]  Referring provider: Alison Veronica MD  Dx:   Encounter Diagnosis     ICD-10-CM    1  Total knee replacement status, right  Z96 651                      Subjective: Paul Showers reports that she is doing well and noticing pain is reducing gradually     Objective: See treatment diary below      Assessment: Tolerated treatment well  Patient reports doing better with regard to pain and stiffness but still having trouble sleeping due to pain complaints  She will be seeing MD next week      Plan: Continue per plan of care        Precautions: Dyspnea (monitor O2)       Manuals             :Patellar mobility/scar mobility  AF                                                    Neuro Re-Ed                                                                                                        Ther Ex             Heel slides 30            Hamstring stretch             Quad sets 30            Gastroc stretch             Bike 10 min                                                    Ther Activity                                       Gait Training                                       Modalities

## 2023-01-17 ENCOUNTER — OFFICE VISIT (OUTPATIENT)
Dept: PHYSICAL THERAPY | Facility: MEDICAL CENTER | Age: 68
End: 2023-01-17

## 2023-01-17 DIAGNOSIS — Z96.651 TOTAL KNEE REPLACEMENT STATUS, RIGHT: Primary | ICD-10-CM

## 2023-01-18 NOTE — PROGRESS NOTES
Daily Note     Today's date: 2023  Patient name: Cierra Amezquita  : 1955  MRN: [id number]  Referring provider: Maryjane Armijo MD  Dx:   Encounter Diagnosis     ICD-10-CM    1  Total knee replacement status, right  Z96 651                      Subjective: Rafi Alcala reports that she is doing well and noticing pain is reducing gradually     Objective: See treatment diary below      Assessment: Tolerated treatment well  Patient reports doing better with regard to pain and stiffness but still having trouble sleeping due to pain complaints  She will be seeing MD next week      Plan: Continue per plan of care        Precautions: Dyspnea (monitor O2)       Manuals             :Patellar mobility/scar mobility  AF                                                    Neuro Re-Ed                                                                                                        Ther Ex             Heel slides 30            Hamstring stretch             Quad sets 30            Gastroc stretch             Bike 10 min                                                    Ther Activity                                       Gait Training                                       Modalities

## 2023-01-20 ENCOUNTER — OFFICE VISIT (OUTPATIENT)
Dept: PHYSICAL THERAPY | Facility: MEDICAL CENTER | Age: 68
End: 2023-01-20

## 2023-01-20 DIAGNOSIS — Z96.651 TOTAL KNEE REPLACEMENT STATUS, RIGHT: Primary | ICD-10-CM

## 2023-01-20 NOTE — PROGRESS NOTES
Daily Note     Today's date: 2023  Patient name: Robert Alberts  : 1955  MRN: [id number]  Referring provider: Christ Gee MD  Dx:   Encounter Diagnosis     ICD-10-CM    1  Total knee replacement status, right  [id number]                       Subjective: Saman Medina reports that she is doing well, had follow up with MD and doing great  She will be going to [place] and DC therapy   Objective: See treatment diary below      Assessment: Tolerated treatment well  Will continue with her home program and DC from therapy     Plan: Continue per plan of care        Precautions: Dyspnea (monitor O2)       Manuals             :Patellar mobility/scar mobility  AF                                                    Neuro Re-Ed                                                                                                        Ther Ex             Heel slides 30            Hamstring stretch             Quad sets 30            Gastroc stretch             Bike 10 min                                                    Ther Activity                                       Gait Training                                       Modalities

## 2023-09-28 ENCOUNTER — TELEPHONE (OUTPATIENT)
Dept: PULMONOLOGY | Facility: CLINIC | Age: 68
End: 2023-09-28

## 2023-09-28 NOTE — TELEPHONE ENCOUNTER
Pt coming in as a Np on 10/03, for ILD. Please request pts CT CHEST from [facility] dated 09/08.  Please advise

## 2023-10-03 ENCOUNTER — CONSULT (OUTPATIENT)
Dept: PULMONOLOGY | Facility: CLINIC | Age: 68
End: 2023-10-03
Payer: MEDICARE

## 2023-10-03 ENCOUNTER — APPOINTMENT (OUTPATIENT)
Dept: LAB | Facility: CLINIC | Age: 68
End: 2023-10-03
Payer: MEDICARE

## 2023-10-03 VITALS
WEIGHT: 163.6 LBS | RESPIRATION RATE: 18 BRPM | DIASTOLIC BLOOD PRESSURE: 88 MMHG | OXYGEN SATURATION: 96 % | BODY MASS INDEX: 30.11 KG/M2 | HEIGHT: 62 IN | HEART RATE: 102 BPM | SYSTOLIC BLOOD PRESSURE: 128 MMHG

## 2023-10-03 DIAGNOSIS — K44.9 HIATAL HERNIA: ICD-10-CM

## 2023-10-03 DIAGNOSIS — J84.9 INTERSTITIAL LUNG DISEASE (HCC): Primary | ICD-10-CM

## 2023-10-03 DIAGNOSIS — R06.02 SHORTNESS OF BREATH: ICD-10-CM

## 2023-10-03 DIAGNOSIS — Z87.891 HISTORY OF TOBACCO ABUSE: ICD-10-CM

## 2023-10-03 DIAGNOSIS — J84.9 INTERSTITIAL LUNG DISEASE (HCC): ICD-10-CM

## 2023-10-03 DIAGNOSIS — J30.9 ALLERGIC RHINITIS, UNSPECIFIED SEASONALITY, UNSPECIFIED TRIGGER: ICD-10-CM

## 2023-10-03 LAB
IGA SERPL-MCNC: 186 MG/DL (ref 66–433)
IGG SERPL-MCNC: 737 MG/DL (ref 635–1741)
IGM SERPL-MCNC: 110 MG/DL (ref 45–281)

## 2023-10-03 PROCEDURE — 82784 ASSAY IGA/IGD/IGG/IGM EACH: CPT

## 2023-10-03 PROCEDURE — 99205 OFFICE O/P NEW HI 60 MIN: CPT | Performed by: INTERNAL MEDICINE

## 2023-10-03 PROCEDURE — 82787 IGG 1 2 3 OR 4 EACH: CPT

## 2023-10-03 PROCEDURE — 86003 ALLG SPEC IGE CRUDE XTRC EA: CPT

## 2023-10-03 NOTE — PROGRESS NOTES
Pulmonary Consultation   Josephine Cunningham 79 y.o. female MRN: [id number]  10/3/2023      Assessment:    1. Interstitial lung disease (HCC)  · Unclear etiology, prior autoimmune serologies have all been negative. This was presumed to be due to [facility] [place]. She does have some lower lobe bronchiectasis which could be related to chronic aspiration from her years back when she was consuming a significant amount of alcohol. The differential would also include DIP in association with smoking. · She did have an eosinophilia of 1.1 million back in December when her symptoms occurred  · Her most recent PFTs surprisingly were completely negative  · We will check immunoglobulins, ANCA panel, IgG subtype, hypersensitivity pneumonitis panel and Aspergillus specific IgE  · Check barium swallow  · If all work-up is negative, may consider a bronchoscopy with BAL plus or minus transbronchial biopsy    2. Shortness of breath  · Minimal and she continues to be very active. Her primary symptom is a cough  · On her echo almost 1 year ago she did have grade 2 diastolic dysfunction  · I will update her echo to see if this has gotten progressively worse. Would also consider checking an EKG to see if there is any low voltage indicating infiltrative disease 1 of which that may affect the lungs as well would be amyloidosis  -     Echo complete w/ contrast if indicated; Future; Expected date: 10/03/2023    3. History of tobacco abuse  · Smoked 1 to 2 cigarettes/day on and off since the age of 14 and quit in year 2000  · Then used to [address] [place] [address] for considerable amount after that but quit in December 2022    4.  Hiatal hernia  · Has already discussed with thoracic surgery at [facility] regarding surgical correction however she defers at this time  · I did discuss with her that if her work-up for her lung disease is all negative and she does pursue hiatal hernia repair, it would be reasonable to discuss with which ever thoracic surgeon she chooses if a lung biopsy can be performed at the same time    5. Allergic rhinitis, unspecified seasonality, unspecified trigger  · Not undergoing any treatment she does not want to take any medications  · Check Aspergillus specific IgE  · Check ANCA panel      Plan:    Diagnoses and all orders for this visit:    Interstitial lung disease (HCC)  -     IgG, IgA, IgM; Future  -     ANCA Screen With MPO and PR3 With Reflex To ANCA Titer; Future  -     IgG 1, 2, 3, and 4; Future  -     Hypersensitivity Pneumonitis; Future  -     Aspergillus fumagatus IgE; Future  -     FL barium swallow video w speech; Future    Shortness of breath  -     Echo complete w/ contrast if indicated; Future    History of tobacco abuse    Hiatal hernia    Allergic rhinitis, unspecified seasonality, unspecified trigger  -     Aspergillus fumagatus IgE; Future        History of Present Illness   HPI:  Pierre Chen is a 79 y.o. female who has a past medical history of hiatal hernia presents for evaluation of interstitial lung disease. She is currently following with [facility] and has completed extensive work-up    She reports that her symptoms began several days after she had a knee replacement in December 2022. Several days after that she went to the hospital with shortness of breath. She does report vaping a significant amount of THC during that time. She was presumed to have EVALI and has stopped vaping since then. She was also found to have a large hiatal hernia and has been on Protonix for this. She underwent extensive work-up including autoimmune serologies that were all negative. She denies any other systemic symptoms aside from a cough that is productive with thick sputum but denies shortness of breath on exertion. In fact she walked 1 mile today and reports not having any issues. She has smoked 1 to 2 cigarettes/day on and off since the age of 14 and quit in the year 2000.   She has a history of drinking a significant amount of alcohol and has been sober for 13 years. She denies any drug use. She still consumes/in stress medical marijuana. 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 HENT: Negative for congestion, postnasal drip and rhinorrhea. Eyes: Negative for itching. Respiratory: Positive for cough. Negative for shortness of breath, wheezing and stridor. Cardiovascular: Negative for chest pain, palpitations and leg swelling. Gastrointestinal: Negative for abdominal distention, abdominal pain, nausea and vomiting. Genitourinary: Negative for dysuria and flank pain. Musculoskeletal: Negative for arthralgias and myalgias. Skin: Negative for color change. Neurological: Negative for dizziness, light-headedness and headaches. Psychiatric/Behavioral: Negative.         Historical Information   Past Medical History:   Diagnosis Date   • Abnormal Pap smear of cervix    • Anxiety disorder    • Atrophy of vagina    • 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    • Osteoporosis    • Reflux gastritis    • Urinary tract infection    • Vitamin D deficiency      Past Surgical History:   Procedure Laterality Date   • CERVICAL CERCLAGE      with both pregnancies   • COLPOSCOPY  2005   • ENDOMETRIAL BIOPSY     • HALLUX VALGUS CORRECTION     • HAND SURGERY     • INDUCED      • LAPAROSCOPY      for infertility evaluation   • REDUCTION MAMMAPLASTY     • WISDOM TOOTH EXTRACTION       Family History   Problem Relation Age of Onset   • Hypertension Mother    • Lung cancer Father    • Diabetes Neg Hx    • Heart disease Neg Hx    • Stroke Neg Hx    • Thyroid disease Neg Hx        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   Previously used to drink significant amount of alcohol and has been sober for 13 years    Meds/Allergies     Current Outpatient Medications:   •  amphetamine-dextroamphetamine (ADDERALL XR) 30 MG 24 hr capsule, Take 30 mg by mouth, Disp: , Rfl:   •  ARIPiprazole (ABILIFY) 5 mg tablet, Take 5 mg by mouth daily, Disp: , Rfl:   •  ergocalciferol (VITAMIN D2) 50,000 units, Take 50,000 Units by mouth, Disp: , Rfl:   •  FLUoxetine (PROzac) 40 MG capsule, Take 40 mg by mouth, Disp: , Rfl:   •  gabapentin (NEURONTIN) 300 mg capsule, Take 300 mg by mouth 2 (two) times a day, Disp: , Rfl:   •  pantoprazole (PROTONIX) 40 mg tablet, pantoprazole 40 mg tablet,delayed release  take 1 tablet by mouth once daily, Disp: , Rfl:   •  ARIPiprazole (ABILIFY) 1 mg/mL oral solution, Abilify, Disp: , Rfl:   •  betamethasone acetate-betamethasone sodium phosphate (CELESTONE) 6 (3-3) mg/mL, 1 mL, Disp: , Rfl:   •  Calcium Carb-Cholecalciferol 600-800 MG-UNIT TABS, Take by mouth (Patient not taking: Reported on 10/7/2021), Disp: , Rfl:   •  cephalexin (KEFLEX) 500 mg capsule, TAKE 1 CAPSULE BY MOUTH 2 TIMES A DAY FOR 5 DAYS., Disp: , Rfl:   •  clindamycin (CLEOCIN T) 1 % lotion, APPLY A THIN LAYER TO THE AFFECTED AREAS TWICE A DAY, Disp: , Rfl:   •  clotrimazole-betamethasone (LOTRISONE) 1-0.05 % cream, Apply topically 2 (two) times a day for 7 days, Disp: 30 g, Rfl: 0  •  Diclofenac Sodium (VOLTAREN) 1 %, apply 2 grams topically to affected area four times a day, Disp: , Rfl:   •  Influenza Virus Vacc Split PF 0.5 ML SRIDEVI, Fluvirin 7767-7540(PF) 45 mcg (15 mcg x3)/0.5 mL intramuscular syringe  inject 0.5 milliliter intramuscularly, Disp: , Rfl:   •  lidocaine (XYLOCAINE) 1 %, 3 mL, Disp: , Rfl:   •  nirmatrelvir & ritonavir (Paxlovid) tablet therapy pack, take 2 NIRMATRELVIR tablets with 1 RITONAVIR tablet twice a day for 5 days, Disp: , Rfl:   •  omeprazole (PriLOSEC) 20 mg delayed release capsule, , Disp: , Rfl:   •  predniSONE 10 mg tablet, take 4 tablet by mouth once daily for 7 days then take 3 tablet b...  (REFER TO PRESCRIPTION NOTES). , Disp: , Rfl:   •  topiramate (TOPAMAX) 25 mg sprinkle capsule, Take 25 mg by mouth 2 (two) times a day, Disp: , Rfl:   •  triamcinolone acetonide (Zilretta) 32 MG, 32 mL, Disp: , Rfl:   •  zoledronic acid (RECLAST) 5 mg/100 mL IV infusion (premix), Infuse 5 mg/100 mL into a venous catheter (Patient not taking: Reported on 10/7/2021), Disp: , Rfl:   Allergies   Allergen Reactions   • Demerol [Meperidine] Other (See Comments)     hypotension       Vitals: Blood pressure 128/88, pulse 102, resp. rate 18, height 5' 1.5" (1.562 m), weight 74.2 kg (163 lb 9.6 oz), SpO2 96 %, not currently breastfeeding., Body mass index is 30.41 kg/m². Oxygen Therapy  SpO2: 96 %  Oxygen Therapy: None (Room air)    Physical Exam  Physical Exam  Constitutional:       General: She is not in acute distress. Appearance: She is not diaphoretic. HENT:      Head: Normocephalic and atraumatic. Nose: Nose normal.      Mouth/Throat:      Pharynx: No oropharyngeal exudate. Eyes:      General: No scleral icterus. Conjunctiva/sclera: Conjunctivae normal.      Pupils: Pupils are equal, round, and reactive to light. Neck:      Thyroid: No thyromegaly. Vascular: No JVD. Trachea: No tracheal deviation. Cardiovascular:      Rate and Rhythm: Normal rate and regular rhythm. Heart sounds: Normal heart sounds. No murmur heard. No friction rub. No gallop. Pulmonary:      Effort: Pulmonary effort is normal. No respiratory distress. Breath sounds: Normal breath sounds. No stridor. No wheezing or rales. Abdominal:      General: Bowel sounds are normal. There is no distension. Palpations: Abdomen is soft. Tenderness: There is no abdominal tenderness. There is no guarding or rebound. Musculoskeletal:         General: No deformity. Normal range of motion. Cervical back: Normal range of motion and neck supple. Lymphadenopathy:      Cervical: No cervical adenopathy. Skin:     General: Skin is warm. Findings: No erythema or rash. Neurological:      Mental Status: She is alert and oriented to person, place, and time. Cranial Nerves: No cranial nerve deficit. Sensory: No sensory deficit. Labs: I have personally reviewed pertinent lab results. No results found for: "WBC", "HGB", "HCT", "MCV", "PLT"  No results found for: "GLUCOSE", "CALCIUM", "NA", "K", "CO2", "CL", "BUN", "CREATININE"  No results found for: "IGE"  No results found for: "ALT", "AST", "GGT", "ALKPHOS", "BILITOT"    Imaging and other studies: I have personally reviewed pertinent reports. and I have personally reviewed pertinent films in PACS    Pulmonary function testing: Pulmonary function testing from care everywhere reviewed personally by me. Normal spirometry, normal lung volumes, normal diffusion capacity    EKG, Pathology, and Other Studies: I have personally reviewed pertinent reports.    and I have personally reviewed pertinent films in PACS    Viki Taylor MD  Pulmonary and Critical Care   [facility]

## 2023-10-04 LAB — A FUMIGATUS IGE QN: <0.1 KUA/I

## 2023-10-06 LAB
IGG SERPL-MCNC: 738 MG/DL (ref 586–1602)
IGG1 SER-MCNC: 450 MG/DL (ref 248–810)
IGG2 SER-MCNC: 194 MG/DL (ref 130–555)
IGG3 SER-MCNC: 25 MG/DL (ref 15–102)
IGG4 SER-MCNC: 34 MG/DL (ref 2–96)

## 2023-10-09 ENCOUNTER — OFFICE VISIT (OUTPATIENT)
Dept: PHYSICAL THERAPY | Facility: MEDICAL CENTER | Age: 68
End: 2023-10-09
Payer: MEDICARE

## 2023-10-09 DIAGNOSIS — Z96.652 STATUS POST TOTAL LEFT KNEE REPLACEMENT: Primary | ICD-10-CM

## 2023-10-09 PROCEDURE — 97161 PT EVAL LOW COMPLEX 20 MIN: CPT | Performed by: PHYSICAL THERAPIST

## 2023-10-09 NOTE — LETTER
2023    Radha Martins, [address] [facility] [address]  [address] [address]    Patient: Kristin Pedrue   YOB: 1955   Date of Visit: 10/9/2023     Encounter Diagnosis     ICD-10-CM    1. Status post total left knee replacement  V67.864           Dear Dr. Lyssa Alvarez: Thank you for your recent referral of Kristin Perdue. Please review the attached evaluation summary from [address] [place] [address] recent visit. Please verify that you agree with the plan of care by signing the attached order. If you have any questions or concerns, please do not hesitate to call. I sincerely appreciate the opportunity to share in the care of one of your patients and hope to have another opportunity to work with you in the near future. Sincerely,    Beny Leo, PT      Referring Provider:      I certify that I have read the below Plan of Care and certify the need for these services furnished under this plan of treatment while under my care. Radha Martins MD  [address] [facility] [address]  [address] [facility] [address]  Via Fax: [phone]          PT Evaluation     Today's date: 10/11/2023  Patient name: Kristin Perdue  : 1955  MRN: [id number]  Referring provider: Radha Martins MD  Dx:   Encounter Diagnosis     ICD-10-CM    1. Status post total left knee replacement  Z96.652                      Assessment  Assessment details: Kristin Perdue is a 79 y.o. female was evaluated on 10/11/2023  for Status post total left knee replacement  (primary encounter diagnosis). Kristin Perdue has the above listed impairments resulting in functional deficits and negative impact to quality of life. Patient is appropriate for skilled PT intervention to promote maximal return to function and patient specific goals. Patient agrees with outlined treatment plan and all questions were answered to their satisfaction.       Impairments: abnormal muscle firing, abnormal muscle tone, abnormal or restricted ROM, impaired physical strength, lacks appropriate home exercise program and pain with function  Understanding of Dx/Px/POC: good   Prognosis: good    Goals  Impairment Goals  - Decrease pain to 0/10  - Improve ROM equal to contralateral side  - Increase strength equal to contralateral side    Functional Goals  - Increase Functional Status Measure to: Above predicted   - Patient will be independent with comprehensive HEP  - Ambulation is improved to prior level of function  - Stair climbing is improved to prior level of function  - Squatting is improved to prior level of function        Plan  Patient would benefit from: skilled PT  Referral necessary: No  Planned modality interventions: thermotherapy: hydrocollator packs  Planned therapy interventions: home exercise program, manual therapy, neuromuscular re-education, patient education, functional ROM exercises, strengthening, stretching, joint mobilization, graded activity, graded exercise, therapeutic exercise, body mechanics training, motor coordination training and activity modification  Frequency: 2x week  Duration in weeks: 12  Treatment plan discussed with: patient        Subjective Evaluation    History of Present Illness  Mechanism of injury: Chino Gorman Is a 79 y.o. female presenting to therapy s/p L TKA performed 10/4. She reports doing well with regard to pain, has been doing her exercises as prescribed by Rayo Gastelum. She is doing very well, prefers to not use her cane as she can walk better without it. She is hoping  this knee goes as quickly as her right one done last year. Patient Goals  Patient goals for therapy: decreased pain, increased motion, return to sport/leisure activities, independence with ADLs/IADLs and increased strength    Pain  Current pain rating: 3  At best pain ratin  At worst pain ratin  Quality: dull ache, discomfort and tight          Objective     Observations   Left Knee   Negative for edema.      Right Knee   Negative for edema. Neurological Testing     Sensation     Knee   Left Knee   Intact: Light touch    Right Knee   Intact: light touch     Passive Range of Motion   Left Knee   Flexion: 115 degrees   Extension: 0 degrees     Mobility   Patellar Mobility:   Left Knee   WFL: medial, lateral, superior and inferior.      Strength/Myotome Testing     Left Knee   Flexion: 4  Prone flexion: 4  Extension: 4  Quadriceps contraction: fair    Right Knee   Flexion: 5  Prone flexion: 5  Extension: 5             Precautions: None      Manuals 10/11                                                                Neuro Re-Ed                                                                                                        Ther Ex                                                                                                                     Ther Activity                                       Gait Training                                       Modalities

## 2023-10-11 ENCOUNTER — APPOINTMENT (OUTPATIENT)
Dept: PHYSICAL THERAPY | Facility: MEDICAL CENTER | Age: 68
End: 2023-10-11
Payer: MEDICARE

## 2023-10-11 NOTE — PROGRESS NOTES
PT Evaluation     Today's date: 10/11/2023  Patient name: Dary Linder  : 1955  MRN: [id number]  Referring provider: Alvina Shone, MD  Dx:   Encounter Diagnosis     ICD-10-CM    1. Status post total left knee replacement  Z96.652                      Assessment  Assessment details: Dary Linder is a 79 y.o. female was evaluated on 10/11/2023  for Status post total left knee replacement  (primary encounter diagnosis). Dary Linder has the above listed impairments resulting in functional deficits and negative impact to quality of life. Patient is appropriate for skilled PT intervention to promote maximal return to function and patient specific goals. Patient agrees with outlined treatment plan and all questions were answered to their satisfaction.       Impairments: abnormal muscle firing, abnormal muscle tone, abnormal or restricted ROM, impaired physical strength, lacks appropriate home exercise program and pain with function  Understanding of Dx/Px/POC: good   Prognosis: good    Goals  Impairment Goals  - Decrease pain to 0/10  - Improve ROM equal to contralateral side  - Increase strength equal to contralateral side    Functional Goals  - Increase Functional Status Measure to: Above predicted   - Patient will be independent with comprehensive HEP  - Ambulation is improved to prior level of function  - Stair climbing is improved to prior level of function  - Squatting is improved to prior level of function        Plan  Patient would benefit from: skilled PT  Referral necessary: No  Planned modality interventions: thermotherapy: hydrocollator packs  Planned therapy interventions: home exercise program, manual therapy, neuromuscular re-education, patient education, functional ROM exercises, strengthening, stretching, joint mobilization, graded activity, graded exercise, therapeutic exercise, body mechanics training, motor coordination training and activity modification  Frequency: 2x week  Duration in weeks: 12  Treatment plan discussed with: patient        Subjective Evaluation    History of Present Illness  Mechanism of injury: Liu Barnes Is a 79 y.o. female presenting to therapy s/p L TKA performed 10/4. She reports doing well with regard to pain, has been doing her exercises as prescribed by Elijah De La Rosa. She is doing very well, prefers to not use her cane as she can walk better without it. She is hoping  this knee goes as quickly as her right one done last year. Patient Goals  Patient goals for therapy: decreased pain, increased motion, return to sport/leisure activities, independence with ADLs/IADLs and increased strength    Pain  Current pain rating: 3  At best pain ratin  At worst pain ratin  Quality: dull ache, discomfort and tight          Objective     Observations   Left Knee   Negative for edema. Right Knee   Negative for edema. Neurological Testing     Sensation     Knee   Left Knee   Intact: Light touch    Right Knee   Intact: light touch     Passive Range of Motion   Left Knee   Flexion: 115 degrees   Extension: 0 degrees     Mobility   Patellar Mobility:   Left Knee   WFL: medial, lateral, superior and inferior.      Strength/Myotome Testing     Left Knee   Flexion: 4  Prone flexion: 4  Extension: 4  Quadriceps contraction: fair    Right Knee   Flexion: 5  Prone flexion: 5  Extension: 5             Precautions: None      Manuals 10/11                                                                Neuro Re-Ed                                                                                                        Ther Ex                                                                                                                     Ther Activity                                       Gait Training                                       Modalities

## 2023-10-13 DIAGNOSIS — Z12.31 ENCOUNTER FOR SCREENING MAMMOGRAM FOR BREAST CANCER: Primary | ICD-10-CM

## 2023-10-16 ENCOUNTER — OFFICE VISIT (OUTPATIENT)
Dept: PHYSICAL THERAPY | Facility: MEDICAL CENTER | Age: 68
End: 2023-10-16
Payer: MEDICARE

## 2023-10-16 DIAGNOSIS — Z96.652 STATUS POST TOTAL LEFT KNEE REPLACEMENT: Primary | ICD-10-CM

## 2023-10-16 PROCEDURE — 97110 THERAPEUTIC EXERCISES: CPT | Performed by: PHYSICAL THERAPIST

## 2023-10-16 NOTE — PROGRESS NOTES
Daily Note     Today's date: 10/16/2023  Patient name: Og Gomez  : 1955  MRN: [id number]  Referring provider: Danni Guillen MD  Dx:   Encounter Diagnosis     ICD-10-CM    1. Status post total left knee replacement  Z96.652                      Subjective: Madison Cunha reports being more sore today overall       Objective: See treatment diary below      Assessment: Tolerated treatment well. Patient  continues to be ahead of schedule, motion near 120 today. Progress strength as able       Plan: Continue per plan of care.       Precautions: None      Manuals 10/16                                                                Neuro Re-Ed                                                                                                        Ther Ex             Heel slides 30            Quad set  40            SLR flexion 30            Bike 5 min                                                                 Ther Activity                                       Gait Training                                       Modalities

## 2023-10-18 ENCOUNTER — APPOINTMENT (OUTPATIENT)
Dept: LAB | Facility: HOSPITAL | Age: 68
End: 2023-10-18
Payer: MEDICARE

## 2023-10-18 ENCOUNTER — TELEPHONE (OUTPATIENT)
Dept: GYNECOLOGY | Facility: CLINIC | Age: 68
End: 2023-10-18

## 2023-10-18 DIAGNOSIS — N39.0 URINARY TRACT INFECTION WITHOUT HEMATURIA, SITE UNSPECIFIED: Primary | ICD-10-CM

## 2023-10-18 DIAGNOSIS — N39.0 URINARY TRACT INFECTION WITHOUT HEMATURIA, SITE UNSPECIFIED: ICD-10-CM

## 2023-10-18 PROCEDURE — 87186 SC STD MICRODIL/AGAR DIL: CPT

## 2023-10-18 PROCEDURE — 87086 URINE CULTURE/COLONY COUNT: CPT

## 2023-10-18 PROCEDURE — 87077 CULTURE AEROBIC IDENTIFY: CPT

## 2023-10-19 ENCOUNTER — OFFICE VISIT (OUTPATIENT)
Dept: GYNECOLOGY | Facility: CLINIC | Age: 68
End: 2023-10-19

## 2023-10-19 VITALS
HEIGHT: 61 IN | SYSTOLIC BLOOD PRESSURE: 132 MMHG | BODY MASS INDEX: 29.27 KG/M2 | DIASTOLIC BLOOD PRESSURE: 80 MMHG | WEIGHT: 155 LBS

## 2023-10-19 DIAGNOSIS — N95.2 ATROPHY OF VAGINA: ICD-10-CM

## 2023-10-19 DIAGNOSIS — M85.89 OSTEOPENIA OF MULTIPLE SITES: ICD-10-CM

## 2023-10-19 DIAGNOSIS — Z12.31 ENCOUNTER FOR SCREENING MAMMOGRAM FOR BREAST CANCER: ICD-10-CM

## 2023-10-19 DIAGNOSIS — Z91.89 HISTORY OF DES EXPOSURE IN UTERO: Primary | ICD-10-CM

## 2023-10-19 DIAGNOSIS — M81.0 OSTEOPOROSIS, UNSPECIFIED OSTEOPOROSIS TYPE, UNSPECIFIED PATHOLOGICAL FRACTURE PRESENCE: ICD-10-CM

## 2023-10-19 PROCEDURE — G0145 SCR C/V CYTO,THINLAYER,RESCR: HCPCS | Performed by: OBSTETRICS & GYNECOLOGY

## 2023-10-19 NOTE — PROGRESS NOTES
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 General exam-Pap smear done with reflex HPV, self breast awareness reviewed, calcium/vitamin D recommendations discussed, mammogram request given, colonoscopy as per Dr. Benton Elliott, she is up-to-date. 2. vaginal atrophy-mild changes noted on exam.  She uses lubrication with good results. Vaginal furcation/moisturizer sheet given, to use accordingly. 3.  History of 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-Pap smear done today with H PV reflex. To proceed accordingly. 4. history of osteoporosis/osteopenia-continues to follow-up with Dr. Mg Fitzgerald, endocrinology at [facility]. Was previously on Reclast, now on Prolia for the last 2 years or so. DEXA scan from 10/25/2022 with lumbar spine T score -1.0, increased 2.8% and femoral neck -2.1, increased 5.1% at the hip. She will continue to follow-up with her endocrinology specialist.  Calcium, vitamin D, weightbearing exercise recommended and calcium she was reviewed. 5. status post left knee replacement-done 2 weeks ago, doing well. Right knee was replaced 12/7/2022.  6.  Other-patient retired last year,  retired prior to that. Her daughter, a former patient of mine now lives outside [place]. Follow-up 1 year for yearly exam or as needed. Subjective   Patient ID: Mel Velásquez is a 79 y.o. female.     Vitals:    10/19/23 0846   BP: 132/80     HPI    The following portions of the patient's history were reviewed and updated as appropriate: allergies, current medications, past family history, past medical history, past social history, past surgical history, and problem list.  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     Osteoporosis     Reflux gastritis     Urinary tract infection     Vitamin D deficiency      Past Surgical History:   Procedure Laterality Date    CERVICAL CERCLAGE      with both pregnancies    COLPOSCOPY  2005    ENDOMETRIAL BIOPSY      HALLUX VALGUS CORRECTION      HAND SURGERY      INDUCED       LAPAROSCOPY      for infertility evaluation    REDUCTION MAMMAPLASTY      WISDOM TOOTH EXTRACTION       OB History    Para Term  AB Living             2   SAB IAB Ectopic Multiple Live Births                   Current Outpatient Medications:     amphetamine-dextroamphetamine (ADDERALL XR) 30 MG 24 hr capsule, Take 30 mg by mouth, Disp: , Rfl:     ARIPiprazole (ABILIFY) 5 mg tablet, Take 5 mg by mouth daily, Disp: , Rfl:     ergocalciferol (VITAMIN D2) 50,000 units, Take 50,000 Units by mouth, Disp: , Rfl:     FLUoxetine (PROzac) 40 MG capsule, Take 40 mg by mouth, Disp: , Rfl:     gabapentin (NEURONTIN) 300 mg capsule, Take 300 mg by mouth 2 (two) times a day, Disp: , Rfl:     pantoprazole (PROTONIX) 40 mg tablet, pantoprazole 40 mg tablet,delayed release  take 1 tablet by mouth once daily, Disp: , Rfl:     ARIPiprazole (ABILIFY) 1 mg/mL oral solution, Abilify, Disp: , Rfl:     betamethasone acetate-betamethasone sodium phosphate (CELESTONE) 6 (3-3) mg/mL, 1 mL, Disp: , Rfl:     Calcium Carb-Cholecalciferol 600-800 MG-UNIT TABS, Take by mouth (Patient not taking: Reported on 10/7/2021), Disp: , Rfl:     cephalexin (KEFLEX) 500 mg capsule, TAKE 1 CAPSULE BY MOUTH 2 TIMES A DAY FOR 5 DAYS., Disp: , Rfl:     clindamycin (CLEOCIN T) 1 % lotion, APPLY A THIN LAYER TO THE AFFECTED AREAS TWICE A DAY, Disp: , Rfl:     Diclofenac Sodium (VOLTAREN) 1 %, apply 2 grams topically to affected area four times a day, Disp: , Rfl:     Influenza Virus Vacc Split PF 0.5 ML SRIDEVI, Fluvirin [id number](PF) 45 mcg (15 mcg x3)/0.5 mL intramuscular syringe  inject 0.5 milliliter intramuscularly, Disp: , Rfl:     lidocaine (XYLOCAINE) 1 %, 3 mL, Disp: , Rfl:     nirmatrelvir & ritonavir (Paxlovid) tablet therapy pack, take 2 NIRMATRELVIR tablets with 1 RITONAVIR tablet twice a day for 5 days, Disp: , Rfl:     omeprazole (PriLOSEC) 20 mg delayed release capsule, , Disp: , Rfl:     predniSONE 10 mg tablet, take 4 tablet by mouth once daily for 7 days then take 3 tablet b...  (REFER TO PRESCRIPTION NOTES). , Disp: , Rfl:     topiramate (TOPAMAX) 25 mg sprinkle capsule, Take 25 mg by mouth 2 (two) times a day, Disp: , Rfl:     triamcinolone acetonide (Zilretta) 32 MG, 32 mL, Disp: , Rfl:     zoledronic acid (RECLAST) 5 mg/100 mL IV infusion (premix), Infuse 5 mg/100 mL into a venous catheter (Patient not taking: Reported on 10/7/2021), Disp: , Rfl:   Allergies   Allergen Reactions    Demerol [Meperidine] Other (See Comments)     hypotension     Social History     Socioeconomic History    Marital status: /Civil Union     Spouse name: None    Number of children: None    Years of education: None    Highest education level: None   Occupational History    None   Tobacco Use    Smoking status: Former     Types: Cigarettes     Start date:      Quit date:      Years since quittin.8    Smokeless tobacco: Never    Tobacco comments:     Smoked 1 or 2 cigarettes a day   Vaping Use    Vaping Use: Former    Start date: 2021    Quit date: 2022    Substances: THC   Substance and Sexual Activity    Alcohol use: No    Drug use: Yes     Types: Marijuana     Comment: medical marijuana capsules    Sexual activity: Yes     Partners: Male   Other Topics Concern    None   Social History Narrative    None     Social Determinants of Health     Financial Resource Strain: Not on file   Food Insecurity: Not on file   Transportation Needs: Not on file   Physical Activity: Not on file   Stress: Not on file   Social Connections: Not on file   Intimate Partner Violence: Not on file   Housing Stability: Not on file     Family History   Problem Relation Age of Onset    Hypertension Mother     Lung cancer Father     Diabetes Neg Hx     Heart disease Neg Hx     Stroke Neg Hx     Thyroid disease Neg Hx        Review of Systems    Objective   Physical Exam  OBGyn Exam     Objective      /80 (BP Location: Right arm, Patient Position: Sitting)   Ht 5' 1" (1.549 m)   Wt 70.3 kg (155 lb)   BMI 29.29 kg/m²     General:   alert and oriented, in no acute distress   Neck: normal to inspection and palpation   Breast: normal appearance, no masses or tenderness   Heart:    Lungs:    Abdomen: soft, non-tender, without masses or organomegaly   Vulva: normal   Vagina: Mildly atrophic, without erythema or lesions or discharge. Cervix: Mildly atrophic, without erythema or lesions or discharge or cervicitis.   No CMT   Uterus: top normal size, anteverted, non-tender   Adnexa: no mass, fullness, tenderness   Rectum: negative    Psych:  Normal mood and affect   Skin:  Without obvious lesions   Eyes: symmetric, with normal movements and reactivity   Musculoskeletal:  Normal muscle tone and movements appreciated

## 2023-10-20 ENCOUNTER — OFFICE VISIT (OUTPATIENT)
Dept: PHYSICAL THERAPY | Facility: MEDICAL CENTER | Age: 68
End: 2023-10-20
Payer: MEDICARE

## 2023-10-20 DIAGNOSIS — N39.0 E-COLI UTI: Primary | ICD-10-CM

## 2023-10-20 DIAGNOSIS — B96.20 E-COLI UTI: Primary | ICD-10-CM

## 2023-10-20 DIAGNOSIS — Z96.652 STATUS POST TOTAL LEFT KNEE REPLACEMENT: Primary | ICD-10-CM

## 2023-10-20 LAB
BACTERIA UR CULT: ABNORMAL
BACTERIA UR CULT: ABNORMAL

## 2023-10-20 PROCEDURE — 97110 THERAPEUTIC EXERCISES: CPT | Performed by: PHYSICAL THERAPIST

## 2023-10-20 RX ORDER — NITROFURANTOIN 25; 75 MG/1; MG/1
100 CAPSULE ORAL 2 TIMES DAILY
Qty: 14 CAPSULE | Refills: 0 | Status: SHIPPED | OUTPATIENT
Start: 2023-10-20 | End: 2023-10-27

## 2023-10-20 NOTE — PROGRESS NOTES
Daily Note     Today's date: 10/20/2023  Patient name: Fadumo Wilson  : 1955  MRN: [id number]  Referring provider: Rima Mcnally MD  Dx:   Encounter Diagnosis     ICD-10-CM    1. Status post total left knee replacement  Z96.652                      Subjective: Jung Burnett reports doing very well, feels like she turned corner       Objective: See treatment diary below      Assessment: Tolerated treatment well. Patient  continues to be ahead of schedule, motion near 120 today. Progress strength as able       Plan: Continue per plan of care.       Precautions: None      Manuals 10/20                                                                Neuro Re-Ed                                                                                                        Ther Ex             Heel slides 30            Quad set  40            SLR flexion 30            Bike 5 min                                                                 Ther Activity                                       Gait Training                                       Modalities

## 2023-10-23 ENCOUNTER — APPOINTMENT (OUTPATIENT)
Dept: PHYSICAL THERAPY | Facility: MEDICAL CENTER | Age: 68
End: 2023-10-23
Payer: MEDICARE

## 2023-10-25 ENCOUNTER — OFFICE VISIT (OUTPATIENT)
Dept: PHYSICAL THERAPY | Facility: MEDICAL CENTER | Age: 68
End: 2023-10-25
Payer: MEDICARE

## 2023-10-25 DIAGNOSIS — Z96.652 STATUS POST TOTAL LEFT KNEE REPLACEMENT: Primary | ICD-10-CM

## 2023-10-25 PROCEDURE — 97110 THERAPEUTIC EXERCISES: CPT | Performed by: PHYSICAL THERAPIST

## 2023-10-27 ENCOUNTER — OFFICE VISIT (OUTPATIENT)
Dept: PHYSICAL THERAPY | Facility: MEDICAL CENTER | Age: 68
End: 2023-10-27
Payer: MEDICARE

## 2023-10-27 DIAGNOSIS — Z96.652 STATUS POST TOTAL LEFT KNEE REPLACEMENT: Primary | ICD-10-CM

## 2023-10-27 LAB
LAB AP GYN PRIMARY INTERPRETATION: NORMAL
Lab: NORMAL

## 2023-10-27 PROCEDURE — 97110 THERAPEUTIC EXERCISES: CPT | Performed by: PHYSICAL THERAPIST

## 2023-10-27 PROCEDURE — 97140 MANUAL THERAPY 1/> REGIONS: CPT | Performed by: PHYSICAL THERAPIST

## 2023-10-27 NOTE — PROGRESS NOTES
Daily Note     Today's date: 10/27/2023  Patient name: Josephine Cunningham  : 1955  MRN: [id number]  Referring provider: David Kuo MD  Dx:   Encounter Diagnosis     ICD-10-CM    1. Status post total left knee replacement  Z96.652                      Subjective: Tia Martinez reports doing very well, feels more sore today though. Objective: See treatment diary below      Assessment: Tolerated treatment well. Patient continues to progress very well, mobility and strength ahead of schedule       Plan: Continue per plan of care.       Precautions: None      Manuals 10/25                                                                Neuro Re-Ed                                                                                                        Ther Ex             Heel slides 30            Quad set  40            SLR flexion 30            Bike 5 min             Supine clamsehll Black  30                                                   Ther Activity                                       Gait Training                                       Modalities

## 2023-10-30 NOTE — PROGRESS NOTES
Daily Note     Today's date: 10/30/2023  Patient name: Pricilla Landa  : 1955  MRN: [id number]  Referring provider: Rowena Woodall MD  Dx:   Encounter Diagnosis     ICD-10-CM    1. Status post total left knee replacement  Z96.652                      Subjective: Dillan Bone reports doing very well, continues with normal post operative soreness       Objective: See treatment diary below      Assessment: Tolerated treatment well. Patient continues to progress very well, mobility and strength ahead of schedule       Plan: Continue per plan of care.       Precautions: None      Manuals 10/27                                                                Neuro Re-Ed                                                                                                        Ther Ex             Heel slides 30            Quad set  40            SLR flexion 30            Bike 5 min             Supine clamshell Black  30                                                   Ther Activity                                       Gait Training                                       Modalities

## 2023-11-03 ENCOUNTER — OFFICE VISIT (OUTPATIENT)
Dept: PHYSICAL THERAPY | Facility: MEDICAL CENTER | Age: 68
End: 2023-11-03
Payer: MEDICARE

## 2023-11-03 DIAGNOSIS — Z96.652 STATUS POST TOTAL LEFT KNEE REPLACEMENT: Primary | ICD-10-CM

## 2023-11-03 PROCEDURE — 97140 MANUAL THERAPY 1/> REGIONS: CPT | Performed by: PHYSICAL THERAPIST

## 2023-11-03 PROCEDURE — 97110 THERAPEUTIC EXERCISES: CPT | Performed by: PHYSICAL THERAPIST

## 2023-11-03 NOTE — PROGRESS NOTES
Daily Note     Today's date: 11/3/2023  Patient name: Dorothy Turner  : 1955  MRN: [id number]  Referring provider: Al Green MD  Dx:   Encounter Diagnosis     ICD-10-CM    1. Status post total left knee replacement  Z96.652                      Subjective: Mitali Dean reports doing very well, continues with normal post operative soreness       Objective: See treatment diary below      Assessment: Tolerated treatment well. Patient continues to progress very well, mobility and strength ahead of schedule   She will continue with HEP, doing very well and pain will gradually reduce as tissue heals       Plan: Continue per plan of care.       Precautions: None      Manuals 11/3                                                                Neuro Re-Ed                                                                                                        Ther Ex             Heel slides 30            Quad set  40            SLR flexion 30            Bike 5 min             Supine clamshell Black  30                                                   Ther Activity                                       Gait Training                                       Modalities

## 2023-11-30 ENCOUNTER — HOSPITAL ENCOUNTER (OUTPATIENT)
Dept: NON INVASIVE DIAGNOSTICS | Facility: HOSPITAL | Age: 68
Discharge: HOME | End: 2023-11-30
Attending: INTERNAL MEDICINE
Payer: MEDICARE

## 2023-11-30 ENCOUNTER — HOSPITAL ENCOUNTER (OUTPATIENT)
Dept: RADIOLOGY | Facility: HOSPITAL | Age: 68
Discharge: HOME | End: 2023-11-30
Attending: INTERNAL MEDICINE
Payer: MEDICARE

## 2023-11-30 VITALS
SYSTOLIC BLOOD PRESSURE: 132 MMHG | WEIGHT: 155 LBS | HEART RATE: 102 BPM | BODY MASS INDEX: 29.27 KG/M2 | DIASTOLIC BLOOD PRESSURE: 80 MMHG | HEIGHT: 61 IN

## 2023-11-30 DIAGNOSIS — J84.9 INTERSTITIAL LUNG DISEASE (HCC): ICD-10-CM

## 2023-11-30 DIAGNOSIS — R06.02 SHORTNESS OF BREATH: ICD-10-CM

## 2023-11-30 LAB
AORTIC ROOT: 3 CM
APICAL FOUR CHAMBER EJECTION FRACTION: 63 %
ASCENDING AORTA: 3.7 CM
E WAVE DECELERATION TIME: 191 MS
E/A RATIO: 0.64
FRACTIONAL SHORTENING: 36 (ref 28–44)
INTERVENTRICULAR SEPTUM IN DIASTOLE (PARASTERNAL SHORT AXIS VIEW): 1 CM
INTERVENTRICULAR SEPTUM: 1 CM (ref 0.6–1.1)
LAAS-AP2: 16.4 CM2
LAAS-AP4: 17.3 CM2
LEFT ATRIUM SIZE: 3.3 CM
LEFT ATRIUM VOLUME (MOD BIPLANE): 45 ML
LEFT ATRIUM VOLUME INDEX (MOD BIPLANE): 26.6 ML/M2
LEFT INTERNAL DIMENSION IN SYSTOLE: 2.8 CM (ref 2.1–4)
LEFT VENTRICLE DIASTOLIC VOLUME (MOD BIPLANE): 62 ML
LEFT VENTRICLE SYSTOLIC VOLUME (MOD BIPLANE): 25 ML
LEFT VENTRICULAR INTERNAL DIMENSION IN DIASTOLE: 4.4 CM (ref 3.5–6)
LEFT VENTRICULAR POSTERIOR WALL IN END DIASTOLE: 0.8 CM
LEFT VENTRICULAR STROKE VOLUME: 60 ML
LV EF: 59 %
LVSV (TEICH): 60 ML
MV E'TISSUE VEL-SEP: 5 CM/S
MV PEAK A VEL: 0.72 M/S
MV PEAK E VEL: 46 CM/S
MV STENOSIS PRESSURE HALF TIME: 55 MS
MV VALVE AREA P 1/2 METHOD: 4
RA PRESSURE ESTIMATED: 3 MMHG
RIGHT ATRIUM AREA SYSTOLE A4C: 8.9 CM2
RIGHT VENTRICLE ID DIMENSION: 2.9 CM
SL CV LEFT ATRIUM LENGTH A2C: 5 CM
SL CV LV EF: 52
SL CV PED ECHO LEFT VENTRICLE DIASTOLIC VOLUME (MOD BIPLANE) 2D: 90 ML
SL CV PED ECHO LEFT VENTRICLE SYSTOLIC VOLUME (MOD BIPLANE) 2D: 29 ML
TRICUSPID ANNULAR PLANE SYSTOLIC EXCURSION: 2.3 CM

## 2023-11-30 PROCEDURE — 92611 MOTION FLUOROSCOPY/SWALLOW: CPT

## 2023-11-30 PROCEDURE — 93306 TTE W/DOPPLER COMPLETE: CPT

## 2023-11-30 PROCEDURE — 74230 X-RAY XM SWLNG FUNCJ C+: CPT

## 2023-11-30 NOTE — PROCEDURES
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 Oral Phase : Pt presented with oral stage of swallow WFL. Mastication, bolus control, formation, and transfer were WNL. Pharyngeal Phase :  Pt presented with mild pharyngeal dysphagia. Swallows were intermittently min delayed to prompt. Spill occurred to the valleculae with hard solids. Hyoilarnygeal excursion and pharyngeal constriction were WNL. Epiglottic inversion mostly complete. Trace vallecular and pyriform retention with solids. Min osteophytes C4-C7, material passed adequately. Trace retention with solids approx C7. No aspiration or penetration observed throughout assessment. Per Esophageal screen   Pt had stasis with solids throughout thoracic esophagus. Able to clear with liquid wash. Min retropulsion and slow motility observed with thin liquids in distal esophagus. Whole 13 mm pill passed adequately. Recommendations:  Diet: Regular , avoid dry/dense foods, add condiments to moisten   Liquids:  Thin   Meds: as tolerated   Strategies: slow rate, alternate liquids with solids, swallow prior to additional po  Upright position  F/u ST tx: no  Reflux Precautions  Consider consult with: GI hiatal hernia  Results reviewed with: pt  If a dedicated assessment of the esophagus is desired, consider esophagram/barium swallow or EGD. Pt is a 80 yearold female referred by Wily Peterson MD. She denied difficulties with chewing or swallowing. Stated occasionally feels food stuck in her chest. Denied recent unexpected weight loss, however stated her and her  have been dieting since July. Pt is aware of hiatial hernia. H&P/Admit info, pertinent provider notes/procedures/studies/PMH:(copied and placed in this report)  Melany Dilalo is a 79 y.o. female who has a past medical history of hiatal hernia presents for evaluation of interstitial lung disease. She is currently following with [facility] and has completed extensive work-up     She reports that her symptoms began several days after she had a knee replacement in December 2022. Several days after that she went to the hospital with shortness of breath. She does report vaping a significant amount of THC during that time. She was presumed to have EVALI and has stopped vaping since then. She was also found to have a large hiatal hernia and has been on Protonix for this. She underwent extensive work-up including autoimmune serologies that were all negative. She denies any other systemic symptoms aside from a cough that is productive with thick sputum but denies shortness of breath on exertion. In fact she walked 1 mile today and reports not having any issues. She has smoked 1 to 2 cigarettes/day on and off since the age of 14 and quit in the year 2000. She has a history of drinking a significant amount of alcohol and has been sober for 13 years. She denies any drug use. She still consumes/in stress medical marijuana.   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 Stable findings of chronic interstitial lung disease. Previous patchy   groundglass opacities have resolved, and may reflect resolution of infectious or   inflammatory process. No other acute abnormality in the chest.   2.  Large hiatal hernia appears similar to previous exam.         Food allergies:  No known    Current diet:  Regular and thin    Premorbid diet:  Regular and thin   Dentition  Adequate    Oral Mech  WNL   O2 requirements:  Room air    Vocal Quality/Speech WNL    Cognitive status:  Alert          Consistencies administered: Barium laden applesauce, nutrigrain bar, hard cookie, sandwhich,  thin liquids, 13mm barium pill. Liquids were administered by cup and straw. Pt stood to be laterally at 90 degrees and  in AP position.

## 2023-12-11 ENCOUNTER — EVALUATION (OUTPATIENT)
Dept: PHYSICAL THERAPY | Facility: CLINIC | Age: 68
End: 2023-12-11
Payer: MEDICARE

## 2023-12-11 DIAGNOSIS — R29.898 WEAKNESS OF LEFT HIP: ICD-10-CM

## 2023-12-11 DIAGNOSIS — M25.562 CHRONIC PAIN OF LEFT KNEE: Primary | ICD-10-CM

## 2023-12-11 DIAGNOSIS — G89.29 CHRONIC PAIN OF LEFT KNEE: Primary | ICD-10-CM

## 2023-12-11 PROCEDURE — 97161 PT EVAL LOW COMPLEX 20 MIN: CPT

## 2023-12-11 PROCEDURE — 97110 THERAPEUTIC EXERCISES: CPT

## 2023-12-11 NOTE — LETTER
2023    Rayna Erickson MD  [address]  [address] [facility]    Patient: Austyn Giordano   YOB: 1955   Date of Visit: 2023     Encounter Diagnosis     ICD-10-CM    1. Chronic pain of left knee  M25.562     G89.29       2. Weakness of left hip  R29.898           Dear Dr. Beryl Mcnulty: Thank you for your recent referral of Austyn Giordano. Please review the attached evaluation summary from [address] [place] [address] recent visit. Please verify that you agree with the plan of care by signing the attached order. If you have any questions or concerns, please do not hesitate to call. I sincerely appreciate the opportunity to share in the care of one of your patients and hope to have another opportunity to work with you in the near future. Sincerely,    Marisol Noel, PT      Referring Provider:      I certify that I have read the below Plan of Care and certify the need for these services furnished under this plan of treatment while under my care. Rayna Erickson MD  [address]  [address] [place] [address]  Via Fax: [phone]          PT Evaluation     Today's date: 2023  Patient name: Austyn Giordano  : 1955  MRN: [id number]  Referring provider: Rayna Erickson MD  Dx:   Encounter Diagnosis     ICD-10-CM    1. Chronic pain of left knee  M25.562     G89.29       2. Weakness of left hip  R29.898                      Assessment  Assessment details: Problem List:  1) decreased neuromuscular control LE - addressing with quad activation, hamstring strength, hip strengthening    Austyn Giordano is a pleasant 79 y.o. female who presents with complaints of left knee pain on medial joint line following L TKA. She has decreased neuromuscular control of BLE resulting in decreased functional tolerance. No further referral appears necessary at this time based upon examination results.   I expect she will benefit from formal physical therapy to address functional deficits. Comparable signs:  1) quad activation  2) squat      Impairments: activity intolerance, impaired balance, impaired physical strength, lacks appropriate home exercise program and pain with function    Symptom irritability: lowUnderstanding of Dx/Px/POC: good   Prognosis: good    Goals  Short Term Goals: to be achieved by 4 weeks  1) Patient to be independent with basic HEP. 2) Decrease pain to 4/10 at its worst.  3) Increase LE strength by 1/2 MMT grade in all deficient planes post-op. Long Term Goals: to be achieved by discharge  1) Patient to be independent with comprehensive HEP. 2) Increase LE strength to 5/5 MMT grade in all planes to improve a/iadls post-op. 3) Increase ambulatory tolerance to 60 min. 4) Patient to have FOTO score >67. Plan  Plan details: Address physical impairments found during IE via therapeutic exercises, neuromuscular re-education, and manual therapy to improve functional tolerance. Develop HEP for self-management of symptoms. Patient would benefit from: skilled physical therapy  Referral necessary: No  Planned therapy interventions: home exercise program, flexibility, balance, joint mobilization, manual therapy, massage, neuromuscular re-education, patient education, strengthening, stretching, therapeutic activities, therapeutic exercise and therapeutic training  Frequency: 2x week  Duration in visits: 16  Duration in weeks: 8  Plan of Care beginning date: 12/12/2023  Plan of Care expiration date: 2/5/2024  Treatment plan discussed with: patient      Subjective Evaluation    History of Present Illness  Date of onset: 10/4/2023  Mechanism of injury: surgery  Mechanism of injury: Patient presents to OP PT with history of L TKA on October 4th, 2023 with Mello Vanegas, received PT on [address]. Was told she had good ROM and was told there was nothing else they could do. Reports pain and stiffness isn't getting any better.  Had right knee done in December of last year and around 7 weeks "turned a corner". Followed up with [facility] 1-2 weeks ago and was told to give it a couple more weeks. Thinking she "didn't do the exercises right" and says she didn't do much and thinks she didn't do enough. Current complaints include pain, stiffness, and some strength deficits. Though notes she's able to go up/down steps. 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 Most concerned with wanting pain to go away. Not a recurrent problem   Quality of life: good    Patient Goals  Patient goals for therapy: decreased pain, increased motion and return to sport/leisure activities  Patient goal: return to swimming and walking  Pain  Current pain ratin  At best pain ratin  At worst pain ratin  Location: medial and lateral left knee joint line  Quality: dull ache  Relieving factors: medications and rest (Meloxicam)  Aggravating factors: stair climbing and walking    Treatments  Previous treatment: physical therapy        Objective     General Comments:      Knee Comments  Posture: WNL  Palpation: TTP left knee medial joint line and pes anserine  Myotomes (L/R): intact b/l              GAIT: WNL  Squat assess: unable to perform  STS: performs without use of hands, pain in left knee  Steps: performs step-to pattern and use of handrail            MMT     AROM             PROM   Hip       L     R         L       R               L     R  Flex. 4- 4-       Extn. 4- 4       Abd. 4-! 4             . Knee        Exten 4-! 4  0 0    Flex 4-! 4  125! 120 135! 123!                   Special Test:  90/90 Test:  (-) b/l    Letitia: (-)                    Precautions: osteoporosis/osteopenia      Manuals                                                                 Neuro Re-Ed             Bridge HEP            Clamshell HEP            SLR HEP            LAQ HEP            Standing HS curl HEP            Heel raise             Leg press Ecc step down             Banded lateral walking             Ther Ex             Pt education CS            RB - activity tolerance                                                                                           Ther Activity             F[facility]             LSU             Gait Training                                       Modalities

## 2023-12-11 NOTE — PROGRESS NOTES
PT Evaluation     Today's date: 2023  Patient name: Kate Dong  : 1955  MRN: [id number]  Referring provider: Michael Romero MD  Dx:   Encounter Diagnosis     ICD-10-CM    1. Chronic pain of left knee  M25.562     G89.29       2. Weakness of left hip  R29.898                      Assessment  Assessment details: Problem List:  1) decreased neuromuscular control LE - addressing with quad activation, hamstring strength, hip strengthening    Kate Dong is a pleasant 79 y.o. female who presents with complaints of left knee pain on medial joint line following L TKA. She has decreased neuromuscular control of BLE resulting in decreased functional tolerance. No further referral appears necessary at this time based upon examination results. I expect she will benefit from formal physical therapy to address functional deficits. Comparable signs:  1) quad activation  2) squat      Impairments: activity intolerance, impaired balance, impaired physical strength, lacks appropriate home exercise program and pain with function    Symptom irritability: lowUnderstanding of Dx/Px/POC: good   Prognosis: good    Goals  Short Term Goals: to be achieved by 4 weeks  1) Patient to be independent with basic HEP. 2) Decrease pain to 4/10 at its worst.  3) Increase LE strength by 1/2 MMT grade in all deficient planes post-op. Long Term Goals: to be achieved by discharge  1) Patient to be independent with comprehensive HEP. 2) Increase LE strength to 5/5 MMT grade in all planes to improve a/iadls post-op. 3) Increase ambulatory tolerance to 60 min. 4) Patient to have FOTO score >67. Plan  Plan details: Address physical impairments found during IE via therapeutic exercises, neuromuscular re-education, and manual therapy to improve functional tolerance. Develop HEP for self-management of symptoms.     Patient would benefit from: skilled physical therapy  Referral necessary: No  Planned therapy interventions: home exercise program, flexibility, balance, joint mobilization, manual therapy, massage, neuromuscular re-education, patient education, strengthening, stretching, therapeutic activities, therapeutic exercise and therapeutic training  Frequency: 2x week  Duration in visits: 16  Duration in weeks: 8  Plan of Care beginning date: 2023  Plan of Care expiration date: 2024  Treatment plan discussed with: patient      Subjective Evaluation    History of Present Illness  Date of onset: 10/4/2023  Mechanism of injury: surgery  Mechanism of injury: Patient presents to OP PT with history of L TKA on 2023 with Adrianna Plunkett, received PT on [facility]. Was told she had good ROM and was told there was nothing else they could do. Reports pain and stiffness isn't getting any better. Had right knee done in December of last year and around 7 weeks "turned a corner". Followed up with Adrianna Plunkett 1-2 weeks ago and was told to give it a couple more weeks. Thinking she "didn't do the exercises right" and says she didn't do much and thinks she didn't do enough. Current complaints include pain, stiffness, and some strength deficits. Though notes she's able to go up/down steps. 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 Most concerned with wanting pain to go away. Not a recurrent problem   Quality of life: good    Patient Goals  Patient goals for therapy: decreased pain, increased motion and return to sport/leisure activities  Patient goal: return to swimming and walking  Pain  Current pain ratin  At best pain ratin  At worst pain ratin  Location: medial and lateral left knee joint line  Quality: dull ache  Relieving factors: medications and rest (Meloxicam)  Aggravating factors: stair climbing and walking    Treatments  Previous treatment: physical therapy        Objective     General Comments:      Knee Comments  Posture:  WNL  Palpation: TTP left knee medial joint line and pes anserine  Myotomes (L/R): intact b/l              GAIT: WNL  Squat assess: unable to perform  STS: performs without use of hands, pain in left knee  Steps: performs step-to pattern and use of handrail            MMT     AROM             PROM   Hip       L     R         L       R               L     R  Flex. 4- 4-       Extn. 4- 4       Abd. 4-! 4             . Knee        Exten 4-! 4  0 0    Flex 4-! 4  125! 120 135! 123!                   Special Test:  90/90 Test:  (-) b/l    Letitia: (-)                    Precautions: osteoporosis/osteopenia      Manuals 12/11                                                                Neuro Re-Ed             Bridge HEP            Clamshell HEP            SLR HEP            LAQ HEP            Standing HS curl HEP            Heel raise             Leg press             Ecc step down             Banded lateral walking             Ther Ex             Pt education CS            RB - activity tolerance                                                                                           Ther Activity             FSU             LSU             Gait Training                                       Modalities

## 2023-12-15 ENCOUNTER — OFFICE VISIT (OUTPATIENT)
Dept: PHYSICAL THERAPY | Facility: CLINIC | Age: 68
End: 2023-12-15
Payer: MEDICARE

## 2023-12-15 DIAGNOSIS — R29.898 WEAKNESS OF LEFT HIP: ICD-10-CM

## 2023-12-15 DIAGNOSIS — G89.29 CHRONIC PAIN OF LEFT KNEE: Primary | ICD-10-CM

## 2023-12-15 DIAGNOSIS — M25.562 CHRONIC PAIN OF LEFT KNEE: Primary | ICD-10-CM

## 2023-12-15 PROCEDURE — 97110 THERAPEUTIC EXERCISES: CPT

## 2023-12-15 PROCEDURE — 97112 NEUROMUSCULAR REEDUCATION: CPT

## 2023-12-15 NOTE — PROGRESS NOTES
Daily Note     Today's date: 12/15/2023  Patient name: Josephine Fan  : 1955  MRN: [id number]  Referring provider: Vega Woods MD  Dx:   Encounter Diagnosis     ICD-10-CM    1. Chronic pain of left knee  M25.562     G89.29       2. Weakness of left hip  R29.898                      Subjective: Patient reports doing exercises at home without any issue. Says "push me hard", "I just want to get better". Objective: See treatment diary below      Assessment: Tolerated treatment well. Patient able to progress with LE strengthening without adverse reaction. Patient enjoyed addition of exercises and continues to express wanting to be challenged by exercises. Patient would benefit from continued PT for continued strengthening of LE. Plan: Progress treatment as tolerated.        Precautions: osteoporosis/osteopenia      Manuals 12/11 12/15                                                               Neuro Re-Ed             Bridge HEP 2x10 btb abd           Clamshell HEP            SLR HEP X10  2x10 2# L           LAQ HEP 20x5" 3# L           Standing HS curl HEP 20x 3# L           Heel raise  20x   3" ecc B           Leg press  2x12 95#           Ecc step down             Banded lateral walking             TKE  nv           Ther Ex             Pt education CS CS           RB - activity tolerance  5'                                                                                         Ther Activity             FSU  2x10 6" L           LSU  2x10 6" L           Gait Training                                       Modalities

## 2023-12-18 ENCOUNTER — OFFICE VISIT (OUTPATIENT)
Dept: PHYSICAL THERAPY | Facility: CLINIC | Age: 68
End: 2023-12-18
Payer: MEDICARE

## 2023-12-18 DIAGNOSIS — M25.562 CHRONIC PAIN OF LEFT KNEE: Primary | ICD-10-CM

## 2023-12-18 DIAGNOSIS — R29.898 WEAKNESS OF LEFT HIP: ICD-10-CM

## 2023-12-18 DIAGNOSIS — G89.29 CHRONIC PAIN OF LEFT KNEE: Primary | ICD-10-CM

## 2023-12-18 PROCEDURE — 97530 THERAPEUTIC ACTIVITIES: CPT

## 2023-12-18 PROCEDURE — 97112 NEUROMUSCULAR REEDUCATION: CPT

## 2023-12-18 NOTE — PROGRESS NOTES
"Daily Note     Today's date: 2023  Patient name: Evelyn Ortiz  : 1955  MRN: [id number]  Referring provider: Chastity Badillo MD  Dx:   Encounter Diagnosis     ICD-10-CM    1. Chronic pain of left knee  M25.562     G89.29       2. Weakness of left hip  R29.898                      Subjective: Patient reports knee gets sore around 5pm every day. \"I hope the pain gets better\".       Objective: See treatment diary below      Assessment: Tolerated treatment well. Patient demonstrated appropriate levels of fatigue with progressions and would benefit from continued PT to address remaining strength deficits.       Plan: Progress treatment as tolerated.       Precautions: osteoporosis/osteopenia      Manuals 12/11 12/15 12/18                                                              Neuro Re-Ed             Bridge HEP 2x10 btb abd 3x10 btb abd          Clamshell HEP            SLR HEP X10  2x10 2# L 3x10 2# L          LAQ HEP 20x5\" 3# L 20x5\" 4# L          Standing HS curl HEP 20x 3# L 20x 4# L          Heel raise  20x   3\" ecc B 30x3\" ecc B          Leg press  2x12 95# 2x12  105#          Ecc step down   2x10 4\"          Banded lateral walking             TKE  nv 20x5\" 15#          Ther Ex             Pt education CS CS           RB - activity tolerance  5' 5' upright                                                                                        Ther Activity             FSU  2x10 6\" L 2x10 8\" L          LSU  2x10 6\" L 2x10 8\" L          Gait Training                                       Modalities                                              "

## 2023-12-21 ENCOUNTER — OFFICE VISIT (OUTPATIENT)
Dept: PHYSICAL THERAPY | Facility: CLINIC | Age: 68
End: 2023-12-21
Payer: MEDICARE

## 2023-12-21 DIAGNOSIS — M25.562 CHRONIC PAIN OF LEFT KNEE: Primary | ICD-10-CM

## 2023-12-21 DIAGNOSIS — G89.29 CHRONIC PAIN OF LEFT KNEE: Primary | ICD-10-CM

## 2023-12-21 DIAGNOSIS — R29.898 WEAKNESS OF LEFT HIP: ICD-10-CM

## 2023-12-21 PROCEDURE — 97112 NEUROMUSCULAR REEDUCATION: CPT

## 2023-12-21 PROCEDURE — 97530 THERAPEUTIC ACTIVITIES: CPT

## 2023-12-21 PROCEDURE — 97110 THERAPEUTIC EXERCISES: CPT

## 2023-12-21 NOTE — PROGRESS NOTES
"Daily Note     Today's date: 2023  Patient name: Evelyn Ortiz  : 1955  MRN: [id number]  Referring provider: Chastity Badillo MD  Dx:   Encounter Diagnosis     ICD-10-CM    1. Chronic pain of left knee  M25.562     G89.29       2. Weakness of left hip  R29.898                      Subjective: Patient reports feeling like she's getting stronger, \"I'm doing steps normally\".       Objective: See treatment diary below      Assessment: Tolerated treatment well. Patient progressing well with LE resisted exercises. Improving with functional tolerance. Patient would benefit from continued PT to further progress functional ability.       Plan: Progress treatment as tolerated.       Precautions: osteoporosis/osteopenia      Manuals 12/11 12/15 12/18 12/21                                                             Neuro Re-Ed             Bridge HEP 2x10 btb abd 3x10 btb abd 3x12 btb abd         Clamshell HEP            SLR HEP X10  2x10 2# L 3x10 2# L 3x10 3# B         LAQ HEP 20x5\" 3# L 20x5\" 4# L 25x5\" 4# B         Standing HS curl HEP 20x 3# L 20x 4# L 25x 4# B         Heel raise  20x   3\" ecc B 30x3\" ecc B 30x3\" ecc B 4\"         Leg press  2x12 95# 2x12  105# 2x12  115#         Ecc step down   2x10 4\" 2x10 6\"  For/lat         Banded lateral walking    Gtb 2 laps at mirror         TKE  nv 20x5\" 15#          Single leg RDL             Ther Ex             Pt education CS CS           RB - activity tolerance  5' 5' upright 5' upright                                                                                        Ther Activity             FSU  2x10 6\" L 2x10 8\" L 2x10 8\" L         LSU  2x10 6\" L 2x10 8\" L 2x10 8\" L         Gait Training                                       Modalities                                                "

## 2023-12-26 ENCOUNTER — OFFICE VISIT (OUTPATIENT)
Dept: PHYSICAL THERAPY | Facility: CLINIC | Age: 68
End: 2023-12-26
Payer: MEDICARE

## 2023-12-26 DIAGNOSIS — M25.562 CHRONIC PAIN OF LEFT KNEE: Primary | ICD-10-CM

## 2023-12-26 DIAGNOSIS — R29.898 WEAKNESS OF LEFT HIP: ICD-10-CM

## 2023-12-26 DIAGNOSIS — G89.29 CHRONIC PAIN OF LEFT KNEE: Primary | ICD-10-CM

## 2023-12-26 PROCEDURE — 97110 THERAPEUTIC EXERCISES: CPT

## 2023-12-26 PROCEDURE — 97112 NEUROMUSCULAR REEDUCATION: CPT

## 2023-12-26 NOTE — PROGRESS NOTES
"Daily Note     Today's date: 2023  Patient name: Evelyn Ortiz  : 1955  MRN: [id number]  Referring provider: Chastity Badillo MD  Dx:   Encounter Diagnosis     ICD-10-CM    1. Chronic pain of left knee  M25.562     G89.29       2. Weakness of left hip  R29.898                      Subjective: Feels like \"slowly but surely getting stronger\".       Objective: See treatment diary below      Assessment: Tolerated treatment well. Continued with progression of LE strengthening and functional tolerance. Patient would benefit from continued PT.      Plan: Progress treatment as tolerated.       Precautions: osteoporosis/osteopenia      Manuals 12/11 12/15 12/18 12/21 12/26                                                            Neuro Re-Ed             Bridge HEP 2x10 btb abd 3x10 btb abd 3x12 btb abd 2x10 burgundy abd        Clamshell HEP            SLR HEP X10  2x10 2# L 3x10 2# L 3x10 3# B 3x10 3# B        LAQ HEP 20x5\" 3# L 20x5\" 4# L 25x5\" 4# B 20x5\" 5# B        Standing HS curl HEP 20x 3# L 20x 4# L 25x 4# B 20x 5# B        Heel raise  20x   3\" ecc B 30x3\" ecc B 30x3\" ecc B 4\" 30x3\" ecc B         Leg press  2x12 95# 2x12  105# 2x12  115# 3x10 125#        Ecc step down   2x10 4\" 2x10 6\"  For/lat 2x10 8\" for/lat        Banded lateral walking    Gtb 2 laps at mirror Gtb 3 laps at mirror         TKE  nv 20x5\" 15#          Single leg RDL             Ther Ex             Pt education CS CS           RB - activity tolerance  5' 5' upright 5' upright  5' upright                                                                                      Ther Activity             FSU  2x10 6\" L 2x10 8\" L 2x10 8\" L 2x10 8\" L        LSU  2x10 6\" L 2x10 8\" L 2x10 8\" L 2x10 8\" L        Gait Training                                       Modalities                                                  "

## 2023-12-27 ENCOUNTER — OFFICE VISIT (OUTPATIENT)
Dept: PULMONOLOGY | Facility: CLINIC | Age: 68
End: 2023-12-27
Payer: MEDICARE

## 2023-12-27 VITALS
HEART RATE: 83 BPM | BODY MASS INDEX: 25.69 KG/M2 | OXYGEN SATURATION: 98 % | WEIGHT: 139.6 LBS | DIASTOLIC BLOOD PRESSURE: 88 MMHG | HEIGHT: 62 IN | SYSTOLIC BLOOD PRESSURE: 134 MMHG

## 2023-12-27 DIAGNOSIS — J84.9 CHRONIC INTERSTITIAL LUNG DISEASE (HCC): Primary | ICD-10-CM

## 2023-12-27 DIAGNOSIS — K44.9 HIATAL HERNIA: ICD-10-CM

## 2023-12-27 DIAGNOSIS — Z72.0 TOBACCO USE: ICD-10-CM

## 2023-12-27 PROCEDURE — 99214 OFFICE O/P EST MOD 30 MIN: CPT | Performed by: INTERNAL MEDICINE

## 2023-12-27 RX ORDER — MELOXICAM 15 MG/1
15 TABLET ORAL DAILY
COMMUNITY
Start: 2023-10-30

## 2023-12-27 NOTE — PROGRESS NOTES
Pulmonary Follow Up Note   Evelyn Ortiz 68 y.o. female MRN: [id number]  12/27/2023      Assessment:    1. Chronic interstitial lung disease (HCC)  Stable on repeat imaging, ground glass has resolved  Autoimmune serologies are negative  PFTs are normal and she is asymptomatic  Can repeat HRCT and PFTs in 1 year.   She will continue follow up with [facility]    2. Hiatal hernia  Large sliding hiatal hernia  Barium swallow with esophageal stasis  Recommend thoracic surgery eval. She is not ready at this time    3. Tobacco use  History of tobacco abuse and quit in 2000 then began vaping but quit recently  Counseled to continue to abstain from smoking    Plan:    Diagnoses and all orders for this visit:    Chronic interstitial lung disease (HCC)    Hiatal hernia    Tobacco use    Other orders  -     meloxicam (MOBIC) 15 mg tablet; Take 15 mg by mouth daily        Return if symptoms worsen or fail to improve.    History of Present Illness   HPI:  Evelyn Ortiz is a 68 y.o. female who presents for follow up of interstitial lung disease. Since her last visit she reports feeling great. Denies shortness of breath or cough.     Review of Systems   Constitutional:  Negative for appetite change, chills and fever.   HENT:  Negative for congestion, ear pain, postnasal drip, rhinorrhea, sneezing, sore throat and trouble swallowing.    Eyes:  Negative for itching.   Respiratory:  Negative for cough, shortness of breath, wheezing and stridor.    Cardiovascular:  Negative for chest pain, palpitations and leg swelling.   Gastrointestinal:  Negative for abdominal distention, abdominal pain, nausea and vomiting.   Genitourinary:  Negative for dysuria and flank pain.   Musculoskeletal:  Negative for arthralgias and myalgias.   Skin:  Negative for color change.   Neurological:  Negative for dizziness, light-headedness and headaches.   Psychiatric/Behavioral: Negative.         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     Osteoporosis     Reflux gastritis     Urinary tract infection     Vitamin D deficiency      Past Surgical History:   Procedure Laterality Date    CERVICAL CERCLAGE      with both pregnancies    COLPOSCOPY  2005    ENDOMETRIAL BIOPSY      HALLUX VALGUS CORRECTION      HAND SURGERY      INDUCED       LAPAROSCOPY      for infertility evaluation    REDUCTION MAMMAPLASTY      WISDOM TOOTH EXTRACTION       Family History   Problem Relation Age of Onset    Hypertension Mother     Lung cancer Father     Diabetes Neg Hx     Heart disease Neg Hx     Stroke Neg Hx     Thyroid disease Neg Hx          Meds/Allergies     Current Outpatient Medications:     amphetamine-dextroamphetamine (ADDERALL XR) 30 MG 24 hr capsule, Take 30 mg by mouth, Disp: , Rfl:     ARIPiprazole (ABILIFY) 5 mg tablet, Take 5 mg by mouth daily, Disp: , Rfl:     ergocalciferol (VITAMIN D2) 50,000 units, Take 50,000 Units by mouth, Disp: , Rfl:     FLUoxetine (PROzac) 40 MG capsule, Take 40 mg by mouth, Disp: , Rfl:     gabapentin (NEURONTIN) 300 mg capsule, Take 300 mg by mouth 2 (two) times a day, Disp: , Rfl:     meloxicam (MOBIC) 15 mg tablet, Take 15 mg by mouth daily, Disp: , Rfl:     pantoprazole (PROTONIX) 40 mg tablet, pantoprazole 40 mg tablet,delayed release  take 1 tablet by mouth once daily, Disp: , Rfl:     ARIPiprazole (ABILIFY) 1 mg/mL oral solution, Abilify, Disp: , Rfl:     betamethasone acetate-betamethasone sodium phosphate (CELESTONE) 6 (3-3) mg/mL, 1 mL, Disp: , Rfl:     Calcium Carb-Cholecalciferol 600-800 MG-UNIT TABS, Take by mouth (Patient not taking: Reported on 10/7/2021), Disp: , Rfl:     cephalexin (KEFLEX) 500 mg capsule, TAKE 1 CAPSULE BY MOUTH 2 TIMES A DAY FOR 5 DAYS., Disp: , Rfl:     clindamycin (CLEOCIN T) 1 % lotion, APPLY A THIN LAYER TO THE AFFECTED AREAS TWICE A DAY, Disp: , Rfl:     Diclofenac Sodium  "(VOLTAREN) 1 %, apply 2 grams topically to affected area four times a day, Disp: , Rfl:     Influenza Virus Vacc Split PF 0.5 ML SRIDEVI Fluvirin 6963-1302(PF) 45 mcg (15 mcg x3)/0.5 mL intramuscular syringe  inject 0.5 milliliter intramuscularly, Disp: , Rfl:     lidocaine (XYLOCAINE) 1 %, 3 mL, Disp: , Rfl:     nirmatrelvir & ritonavir (Paxlovid) tablet therapy pack, take 2 NIRMATRELVIR tablets with 1 RITONAVIR tablet twice a day for 5 days, Disp: , Rfl:     omeprazole (PriLOSEC) 20 mg delayed release capsule, , Disp: , Rfl:     predniSONE 10 mg tablet, take 4 tablet by mouth once daily for 7 days then take 3 tablet b...  (REFER TO PRESCRIPTION NOTES)., Disp: , Rfl:     topiramate (TOPAMAX) 25 mg sprinkle capsule, Take 25 mg by mouth 2 (two) times a day, Disp: , Rfl:     triamcinolone acetonide (Zilretta) 32 MG, 32 mL, Disp: , Rfl:     zoledronic acid (RECLAST) 5 mg/100 mL IV infusion (premix), Infuse 5 mg/100 mL into a venous catheter (Patient not taking: Reported on 10/7/2021), Disp: , Rfl:   Allergies   Allergen Reactions    Demerol [Meperidine] Other (See Comments)     hypotension       Vitals: Blood pressure 134/88, pulse 83, height 5' 1.5\" (1.562 m), weight 63.3 kg (139 lb 9.6 oz), SpO2 98%, not currently breastfeeding. Body mass index is 25.95 kg/m². Oxygen Therapy  SpO2: 98 %      Physical Exam  Physical Exam  Constitutional:       General: She is not in acute distress.     Appearance: She is not diaphoretic.   HENT:      Head: Normocephalic and atraumatic.      Nose: Nose normal.      Mouth/Throat:      Pharynx: No oropharyngeal exudate.   Eyes:      General: No scleral icterus.     Conjunctiva/sclera: Conjunctivae normal.      Pupils: Pupils are equal, round, and reactive to light.   Neck:      Thyroid: No thyromegaly.      Vascular: No JVD.      Trachea: No tracheal deviation.   Cardiovascular:      Rate and Rhythm: Normal rate and regular rhythm.      Heart sounds: Normal heart sounds. No murmur heard.   " "  No friction rub. No gallop.   Pulmonary:      Effort: Pulmonary effort is normal. No respiratory distress.      Breath sounds: Normal breath sounds. No stridor. No wheezing or rales.   Abdominal:      General: Bowel sounds are normal. There is no distension.      Palpations: Abdomen is soft.      Tenderness: There is no abdominal tenderness. There is no guarding or rebound.   Musculoskeletal:         General: No deformity. Normal range of motion.      Cervical back: Normal range of motion and neck supple.   Lymphadenopathy:      Cervical: No cervical adenopathy.   Skin:     General: Skin is warm.      Findings: No erythema or rash.   Neurological:      Mental Status: She is alert and oriented to person, place, and time.      Cranial Nerves: No cranial nerve deficit.      Sensory: No sensory deficit.         Labs: I have personally reviewed pertinent lab results.  No results found for: \"WBC\", \"HGB\", \"HCT\", \"MCV\", \"PLT\"  No results found for: \"GLUCOSE\", \"CALCIUM\", \"NA\", \"K\", \"CO2\", \"CL\", \"BUN\", \"CREATININE\"  No results found for: \"IGE\"  No results found for: \"ALT\", \"AST\", \"GGT\", \"ALKPHOS\", \"BILITOT\"      Imaging and other studies: I have personally reviewed pertinent reports.   and I have personally reviewed pertinent films in PACS  CT chest 2023- improvement of groundglass opacities, persistent stable chronic interstitial changes    Pulmonary function testin2023  Normal spirometry, lung volumes and diffusion capacity    EKG, Pathology, and Other Studies: I have personally reviewed pertinent reports.   and I have personally reviewed pertinent films in PACS    Answers submitted by the patient for this visit:  Pulmonology Questionnaire (Submitted on 2023)  Chief Complaint: Primary symptoms  Chronicity: chronic  When did you first notice your symptoms?: more than 1 year ago  How often do your symptoms occur?: rarely  Since you first noticed this problem, how has it changed?: rapidly improving  Do you " have shortness of breath that occurs with effort or exertion?: No  Do you have ear congestion?: No  Do you have heartburn?: Yes  Do you have fatigue?: No  Do you have nasal congestion?: No  Do you have shortness of breath when lying flat?: No  Do you have shortness of breath when you wake up?: No  Do you have sweats?: No  Have you experienced weight loss?: No      Amadeo Avery MD  Pulmonary and Critical Care   [facility]

## 2023-12-28 ENCOUNTER — APPOINTMENT (OUTPATIENT)
Dept: PHYSICAL THERAPY | Facility: CLINIC | Age: 68
End: 2023-12-28
Payer: MEDICARE

## 2024-01-02 ENCOUNTER — OFFICE VISIT (OUTPATIENT)
Dept: PHYSICAL THERAPY | Facility: CLINIC | Age: 69
End: 2024-01-02
Payer: MEDICARE

## 2024-01-02 DIAGNOSIS — M25.562 CHRONIC PAIN OF LEFT KNEE: Primary | ICD-10-CM

## 2024-01-02 DIAGNOSIS — G89.29 CHRONIC PAIN OF LEFT KNEE: Primary | ICD-10-CM

## 2024-01-02 DIAGNOSIS — R29.898 WEAKNESS OF LEFT HIP: ICD-10-CM

## 2024-01-02 PROCEDURE — 97112 NEUROMUSCULAR REEDUCATION: CPT

## 2024-01-02 PROCEDURE — 97110 THERAPEUTIC EXERCISES: CPT

## 2024-01-02 NOTE — PROGRESS NOTES
"Daily Note     Today's date: 2024  Patient name: Evelyn Ortiz  : 1955  MRN: [id number]  Referring provider: Chastity Badillo MD  Dx:   Encounter Diagnosis     ICD-10-CM    1. Chronic pain of left knee  M25.562     G89.29       2. Weakness of left hip  R29.898                      Subjective: Patient reports knees are feeling much better.       Objective: See treatment diary below    FOTO = 65    Assessment: Tolerated treatment well. Patient able to progress with functional strengthening of LE's without adverse reaction. Encouraged to continue exercises and slowly returning to PLOF such as swimming and the gym. Patient would benefit from continued PT.      Plan: Progress treatment as tolerated.       Precautions: osteoporosis/osteopenia      Manuals 12/11 12/15 12/18 12/21 12/26 1/2                                                           Neuro Re-Ed             Bridge HEP 2x10 btb abd 3x10 btb abd 3x12 btb abd 2x10 burgundy abd 2x10 SL B       Clamshell HEP            SLR HEP X10  2x10 2# L 3x10 2# L 3x10 3# B 3x10 3# B 3x10 4# B       LAQ HEP 20x5\" 3# L 20x5\" 4# L 25x5\" 4# B 20x5\" 5# B 20x5\" 7.5# B       Standing HS curl HEP 20x 3# L 20x 4# L 25x 4# B 20x 5# B 20x 7.5# B       Heel raise  20x   3\" ecc B 30x3\" ecc B 30x3\" ecc B 4\" 30x3\" ecc B  30x3\" ecc B       Leg press  2x12 95# 2x12  105# 2x12  115# 3x10 125# 3x10  135#       Ecc step down   2x10 4\" 2x10 6\"  For/lat 2x10 8\" for/lat 2x10 8\" For/lat       Banded lateral walking    Gtb 2 laps at mirror Gtb 3 laps at mirror         TKE  nv 20x5\" 15#          Single leg RDL             Ther Ex             Pt education CS CS           RB - activity tolerance  5' 5' upright 5' upright  5' upright 5' upright                                                                                      Ther Activity             FSU  2x10 6\" L 2x10 8\" L 2x10 8\" L 2x10 8\" L        LSU  2x10 6\" L 2x10 8\" L 2x10 8\" L 2x10 8\" L        Gait Training                                 "       Modalities

## 2024-01-04 ENCOUNTER — OFFICE VISIT (OUTPATIENT)
Dept: PHYSICAL THERAPY | Facility: CLINIC | Age: 69
End: 2024-01-04
Payer: MEDICARE

## 2024-01-04 DIAGNOSIS — R29.898 WEAKNESS OF LEFT HIP: ICD-10-CM

## 2024-01-04 DIAGNOSIS — M25.562 CHRONIC PAIN OF LEFT KNEE: Primary | ICD-10-CM

## 2024-01-04 DIAGNOSIS — G89.29 CHRONIC PAIN OF LEFT KNEE: Primary | ICD-10-CM

## 2024-01-04 PROCEDURE — 97112 NEUROMUSCULAR REEDUCATION: CPT

## 2024-01-04 PROCEDURE — 97110 THERAPEUTIC EXERCISES: CPT

## 2024-01-04 NOTE — PROGRESS NOTES
"Daily Note     Today's date: 2024  Patient name: Evelyn Ortiz  : 1955  MRN: [id number]  Referring provider: Chastity Badillo MD  Dx:   Encounter Diagnosis     ICD-10-CM    1. Chronic pain of left knee  M25.562     G89.29       2. Weakness of left hip  R29.898                      Subjective: Pt reports some soreness in L knee musculature, attributed to treatment and HEP,  but otherwise reports feeling okay.      Objective: See treatment diary below      Assessment: Tolerated treatment well. Patient demonstrated fatigue post treatment.  Pt progressed with bridges and SLR, required vcs for technique.  Required vcs to prevent hip drop during ecc step downs.  Addition of forward lunges with vcs for technique.  Pt would continue to benefit from interventions targeting functional movement, pt agreeable.      Plan: Continue per plan of care. Add in B/L SL RDLs next visit to address balance deficits.     Precautions: osteoporosis/osteopenia      Manuals 12/11 12/15 12/18 12/21 12/26 1/2 1/4                                                          Neuro Re-Ed             Bridge HEP 2x10 btb abd 3x10 btb abd 3x12 btb abd 2x10 burgundy abd 2x10 SL B 3x10  SL B      Clamshell HEP            SLR HEP X10  2x10 2# L 3x10 2# L 3x10 3# B 3x10 3# B 3x10 4# B 3x15  4# B      LAQ HEP 20x5\" 3# L 20x5\" 4# L 25x5\" 4# B 20x5\" 5# B 20x5\" 7.5# B 20x5\"  7.5# B      Standing HS curl HEP 20x 3# L 20x 4# L 25x 4# B 20x 5# B 20x 7.5# B 20x  7.5# B      Heel raise  20x   3\" ecc B 30x3\" ecc B 30x3\" ecc B 4\" 30x3\" ecc B  30x3\" ecc B 30x3\"  Ecc B      Leg press  2x12 95# 2x12  105# 2x12  115# 3x10 125# 3x10  135#       Ecc step down   2x10 4\" 2x10 6\"  For/lat 2x10 8\" for/lat 2x10 8\" For/lat 2x10 8\"  For/lat      Forward lunges       2x10  B      Banded lateral walking    Gtb 2 laps at mirror Gtb 3 laps at mirror         TKE  nv 20x5\" 15#          Single leg RDL       nv      Ther Ex             Pt education CS CS           RB - activity " "tolerance  5' 5' upright 5' upright  5' upright 5' upright  5'  recumbent                                                                                    Ther Activity             FSU  2x10 6\" L 2x10 8\" L 2x10 8\" L 2x10 8\" L  2x10  8\" L      LSU  2x10 6\" L 2x10 8\" L 2x10 8\" L 2x10 8\" L        Gait Training                                       Modalities                                                      "

## 2024-01-08 ENCOUNTER — APPOINTMENT (OUTPATIENT)
Dept: PHYSICAL THERAPY | Facility: CLINIC | Age: 69
End: 2024-01-08
Payer: MEDICARE

## 2024-01-08 NOTE — PROGRESS NOTES
"Daily Note     Today's date: 2024  Patient name: Evelyn Ortiz  : 1955  MRN: [id number]  Referring provider: Chastity Badillo MD  Dx: No diagnosis found.               Subjective: ***      Objective: See treatment diary below      Assessment: Tolerated treatment {Tolerated treatment :}. Patient {assessment:}      Plan: {PLAN:}     Precautions: osteoporosis/osteopenia      Manuals 12/11 12/15 12/18 12/21 12/26 1/2 1/4 1/8                                                         Neuro Re-Ed             Bridge HEP 2x10 btb abd 3x10 btb abd 3x12 btb abd 2x10 burgundy abd 2x10 SL B 3x10  SL B      Clamshell HEP            SLR HEP X10  2x10 2# L 3x10 2# L 3x10 3# B 3x10 3# B 3x10 4# B 3x15  4# B      LAQ HEP 20x5\" 3# L 20x5\" 4# L 25x5\" 4# B 20x5\" 5# B 20x5\" 7.5# B 20x5\"  7.5# B      Standing HS curl HEP 20x 3# L 20x 4# L 25x 4# B 20x 5# B 20x 7.5# B 20x  7.5# B      Heel raise  20x   3\" ecc B 30x3\" ecc B 30x3\" ecc B 4\" 30x3\" ecc B  30x3\" ecc B 30x3\"  Ecc B      Leg press  2x12 95# 2x12  105# 2x12  115# 3x10 125# 3x10  135#       Ecc step down   2x10 4\" 2x10 6\"  For/lat 2x10 8\" for/lat 2x10 8\" For/lat 2x10 8\"  For/lat      Forward lunges       2x10  B      Banded lateral walking    Gtb 2 laps at mirror Gtb 3 laps at mirror         TKE  nv 20x5\" 15#          Single leg RDL       nv      Ther Ex             Pt education CS CS           RB - activity tolerance  5' 5' upright 5' upright  5' upright 5' upright  5'  recumbent                                                                                    Ther Activity             FSU  2x10 6\" L 2x10 8\" L 2x10 8\" L 2x10 8\" L  2x10  8\" L      LSU  2x10 6\" L 2x10 8\" L 2x10 8\" L 2x10 8\" L        Gait Training                                       Modalities                                                        "

## 2024-01-09 ENCOUNTER — OFFICE VISIT (OUTPATIENT)
Dept: PHYSICAL THERAPY | Facility: CLINIC | Age: 69
End: 2024-01-09
Payer: MEDICARE

## 2024-01-09 DIAGNOSIS — M25.562 CHRONIC PAIN OF LEFT KNEE: Primary | ICD-10-CM

## 2024-01-09 DIAGNOSIS — R29.898 WEAKNESS OF LEFT HIP: ICD-10-CM

## 2024-01-09 DIAGNOSIS — G89.29 CHRONIC PAIN OF LEFT KNEE: Primary | ICD-10-CM

## 2024-01-09 PROCEDURE — 97112 NEUROMUSCULAR REEDUCATION: CPT

## 2024-01-09 PROCEDURE — 97110 THERAPEUTIC EXERCISES: CPT

## 2024-01-09 NOTE — PROGRESS NOTES
Discharge Summary     Today's date: 2024  Patient name: Evelyn Ortiz  : 1955  MRN: [id number]  Referring provider: Chastity Badillo MD  Dx:   Encounter Diagnosis     ICD-10-CM    1. Chronic pain of left knee  M25.562     G89.29       2. Weakness of left hip  R29.898                      Subjective: Pt reports 80% improvement. Pt feels as though her strength has significantly improved with therapy but doesn't feel she has full strength at this time. However, patient has returned to swimming and able to perform all functional activities. Feels as though she's able to manage symptoms and functional progress by continuing with HEP.       Objective: See treatment diary below          MMT     AROM             PROM   Hip       L     R         L       R               L     R  Flex. 4- 4+ 4- 4+       Extn. 4- 4+ 4 4+       Abd. 4-! 4+ 4 4+             .        Knee        Exten 4-! 4+ 4 4+  0 0    Flex 4-! 4+ 4 4+  125! 120 135! 123!                 Assessment: Tolerated treatment well.  Patient demonstrates significant functional improvement based on their progress w/ prescribed interventions.   Endorses they feel confident in performing exercises at home, pt provided w/ HEP and education regarding frequency and technique, pt agreeable and verbalized understanding. Patient will call with any questions/concerns regarding condition moving forward.       Goals - All goals met  Short Term Goals: to be achieved by 4 weeks  1) Patient to be independent with basic HEP.  2) Decrease pain to 4/10 at its worst.  3) Increase LE strength by 1/2 MMT grade in all deficient planes post-op.    Long Term Goals: to be achieved by discharge  1) Patient to be independent with comprehensive HEP.  2) Increase LE strength to 5/5 MMT grade in all planes to improve a/iadls post-op.  3) Increase ambulatory tolerance to 60 min.  4) Patient to have FOTO score >67.     Plan: Discharge POC     Precautions: osteoporosis/osteopenia      Manuals  "12/11 12/15 12/18 12/21 12/26 1/2 1/4 1/9                                                         Neuro Re-Ed             Bridge HEP 2x10 btb abd 3x10 btb abd 3x12 btb abd 2x10 burgundy abd 2x10 SL B 3x10  SL B 3x10  SL B     Clamshell HEP            SLR HEP X10  2x10 2# L 3x10 2# L 3x10 3# B 3x10 3# B 3x10 4# B 3x15  4# B 3x15  4# B     LAQ HEP 20x5\" 3# L 20x5\" 4# L 25x5\" 4# B 20x5\" 5# B 20x5\" 7.5# B 20x5\"  7.5# B 20x5\"  7.5# B     Standing HS curl HEP 20x 3# L 20x 4# L 25x 4# B 20x 5# B 20x 7.5# B 20x  7.5# B 20x  7.5# B     Heel raise  20x   3\" ecc B 30x3\" ecc B 30x3\" ecc B 4\" 30x3\" ecc B  30x3\" ecc B 30x3\"  Ecc B      Leg press  2x12 95# 2x12  105# 2x12  115# 3x10 125# 3x10  135#       Ecc step down   2x10 4\" 2x10 6\"  For/lat 2x10 8\" for/lat 2x10 8\" For/lat 2x10 8\"  For/lat      Forward lunges       2x10  B      Banded lateral walking    Gtb 2 laps at mirror Gtb 3 laps at mirror         TKE  nv 20x5\" 15#          Single leg RDL       nv 1x10     Squats        2x10  At bar     Ther Ex             Pt education CS CS      CS     RB - activity tolerance  5' 5' upright 5' upright  5' upright 5' upright  5'  recumbent 5' upright                                                                                   Ther Activity             FSU  2x10 6\" L 2x10 8\" L 2x10 8\" L 2x10 8\" L  2x10  8\" L      LSU  2x10 6\" L 2x10 8\" L 2x10 8\" L 2x10 8\" L        Gait Training                                       Modalities                                                        "

## 2024-01-15 ENCOUNTER — APPOINTMENT (OUTPATIENT)
Dept: PHYSICAL THERAPY | Facility: CLINIC | Age: 69
End: 2024-01-15
Payer: MEDICARE

## 2024-02-02 DIAGNOSIS — Z12.39 ENCOUNTER FOR BREAST CANCER SCREENING USING NON-MAMMOGRAM MODALITY: Primary | ICD-10-CM

## 2024-03-25 ENCOUNTER — EVALUATION (OUTPATIENT)
Dept: PHYSICAL THERAPY | Facility: CLINIC | Age: 69
End: 2024-03-25
Payer: MEDICARE

## 2024-03-25 DIAGNOSIS — M54.12 CERVICAL RADICULOPATHY: ICD-10-CM

## 2024-03-25 DIAGNOSIS — M79.602 PAIN OF LEFT UPPER EXTREMITY: ICD-10-CM

## 2024-03-25 DIAGNOSIS — M43.6 NECK STIFFNESS: Primary | ICD-10-CM

## 2024-03-25 PROCEDURE — 97161 PT EVAL LOW COMPLEX 20 MIN: CPT

## 2024-03-25 PROCEDURE — 97110 THERAPEUTIC EXERCISES: CPT

## 2024-03-25 NOTE — LETTER
2024    Luis Antonio Sanabria MD  [facility] [employer]  [facility] [address]    Patient: Evelyn Ortiz   YOB: 1955   Date of Visit: 3/25/2024     Encounter Diagnosis     ICD-10-CM    1. Neck stiffness  M43.6       2. Pain of left upper extremity  M79.602       3. Cervical radiculopathy  M54.12           Dear Dr. Sanabria:    Thank you for your recent referral of Evelyn Ortiz. Please review the attached evaluation summary from Evelyn's recent visit.     Please verify that you agree with the plan of care by signing the attached order.     If you have any questions or concerns, please do not hesitate to call.     I sincerely appreciate the opportunity to share in the care of one of your patients and hope to have another opportunity to work with you in the near future.       Sincerely,    Marlene Denny, PT      Referring Provider:      I certify that I have read the below Plan of Care and certify the need for these services furnished under this plan of treatment while under my care.                    Luis Antonio Sanabria MD  [facility] [employer]  [facility] [address]  Via Fax: [phone]          PT Evaluation     Today's date: 3/25/2024  Patient name: Evelyn Ortiz  : 1955  MRN: [id number]  Referring provider: Luis Antonio Sanabria MD  Dx:   Encounter Diagnosis     ICD-10-CM    1. Neck stiffness  M43.6       2. Pain of left upper extremity  M79.602       3. Cervical radiculopathy  M54.12                      Assessment  Assessment details: Problem List:  1) hypomobility of cervical and thoracic spine - addressing with manual mobility exercises, self stretching, CPA thoracic spine   2) decreased neuromuscular control cervicothoracic spine - addressing with prone ITY, scap 4, cervical retractions, postural control    Evelyn Ortiz is a pleasant 68 y.o. female who presents with complaints of left sided neck, shoulder, and arm pain. She has limited cervical rotation L>R,  limited cervical side bending bilaterally, decreased thoracic mobility, poor postural control, and pain resulting in decreased functional tolerance and decreased ability to perform recreational activities such as swimming.  No further referral appears necessary at this time based upon examination results.  I expect she will benefit from physical therapy to address functional deficits.        Comparable signs:  1) radicular symptoms left arm  2) cervical mobility  3) periscapular strength   4) thoracic mobility    Impairments: activity intolerance, impaired physical strength, lacks appropriate home exercise program, pain with function and poor posture     Symptom irritability: low  Goals  Short Term Goals: to be achieved by 4 weeks  1) Patient to be independent with basic HEP.  2) Decrease pain to 3/10 at its worst.  3) Increase cervical spine ROM by 5-10 degrees in all deficient planes.  4) Increase UE strength by 1/2 MMT grade in all deficient planes.  5) Improve joint mobility in cervical spine to normal     Long Term Goals: to be achieved by discharge  1) FOTO equal to or greater than 69.  2) Patient to be independent with comprehensive HEP.  3) Cervical spine ROM WNL all planes to improve a/iadls.  4) Increase UE strength to 1 MMT grade in all planes to improve a/iadls.  5) Lifting is improved to maximal level of function  6) Patient able to swim without symptoms.        Plan  Plan details: Address physical impairments found during IE via therapeutic exercises, neuromuscular re-education, and manual therapy to improve functional tolerance. Develop HEP for self-management of symptoms.    Patient would benefit from: skilled physical therapy  Referral necessary: No  Planned therapy interventions: joint mobilization, nerve gliding, neuromuscular re-education, patient education, postural training, strengthening, stretching, therapeutic activities, therapeutic exercise, flexibility and home exercise  program  Frequency: 2x week  Duration in visits: 16  Duration in weeks: 8  Plan of Care beginning date: 3/25/2024  Plan of Care expiration date: 2024  Treatment plan discussed with: patient        Subjective Evaluation    History of Present Illness  Mechanism of injury: Patient reports having radiculopathy in her neck about 3 years ago which she had an epidural through [facility] that seemed to help. She feels the symptoms are coming back, returned to [facility] where she had an xray. She was instructed to try PT and then return for MRI if no improvement. Pain is in left side of neck and right below elbow. Reports the pain is all the time but enough to address it. Swimming aggravates it which she has decreased frequency.           Recurrent probem    Quality of life: good    Patient Goals  Patient goals for therapy: decreased pain, return to sport/leisure activities and increased strength  Patient goal: return to swimming without pain  Pain  Current pain rating: 3  At best pain ratin  At worst pain ratin  Location: left cervical and radiating into arm below elbow  Quality: radiating and dull ache  Relieving factors: medications  Aggravating factors: lifting and overhead activity (swimming)  Progression: worsening      Diagnostic Tests  X-ray: normal  Treatments  Previous treatment: injection treatment and medication        Objective     Concurrent Complaints  Negative for night pain, disturbed sleep and headaches    Postural Observations  Seated posture: poor  Standing posture: fair  Correction of posture: makes symptoms better      Palpation   Left   Tenderness of the upper trapezius.     Right   Tenderness of the upper trapezius.     Neurological Testing     Reflexes   Left   Biceps (C5/C6): brisk (3+)  Brachioradialis (C6): brisk (3+)  Triceps (C7): brisk (3+)    Right   Biceps (C5/C6): brisk (3+)  Brachioradialis (C6): brisk (3+)  Triceps (C7): brisk (3+)    Active Range of Motion   Cervical/Thoracic  Spine       Cervical    Left lateral flexion:  Restriction level: moderate  Right lateral flexion:  with pain Restriction level moderate  Left rotation:  with pain Restriction level: moderate  Right rotation:  Restriction level: moderate  Left Shoulder   Normal active range of motion    Right Shoulder   Normal active range of motion    Joint Play     Hypomobile: T3, T4, T5, T6, T7 and T8   Mechanical Assessment    Cervical    Seated retraction: repeated movements   Pain location: centralized  Pain intensity: better  Pain level: decreased    Thoracic      Lumbar      Strength/Myotome Testing     Left Shoulder     Planes of Motion   Flexion: 5   Abduction: 5   External rotation at 0°: 5   Internal rotation at 0°: 5     Isolated Muscles   Lower trapezius: 4- (!)   Middle trapezius: 4     Right Shoulder     Planes of Motion   Flexion: 5   Abduction: 5   External rotation at 0°: 5   Internal rotation at 0°: 5     Isolated Muscles   Lower trapezius: 4-   Middle trapezius: 4     Tests   Cervical   Positive cervical distraction test and neck flexor muscle endurance test.  Negative vertical compression.     Left Shoulder   Positive ULTT3 and cervical rotation lateral flexion.   Negative belly press, Hawkin's, painful arc, ULTT1, ULTT2 and ULTT4.     Right Shoulder   Positive cervical rotation lateral flexion.   Negative belly press, Hawkin's and painful arc.     Lumbar   Negative vertical compression.   Neuro Exam:     Headaches   Patient reports headaches: No.              Precautions: Osteoporosis/osteopenia      Manuals 3/25            Cervical SG             UT stretch             Rot mobs                           Neuro Re-Ed             Supine cervical retracttion             Scap squeeze             Prone ITY                                                                 Ther Ex             Seated cervical retraction Hep with extension            SNAG rot B             Pt education CS            UBE - activity  tolerance              Posture correction HEP                                                   Ther Activity                                       Gait Training                                       Modalities

## 2024-03-25 NOTE — PROGRESS NOTES
PT Evaluation     Today's date: 3/25/2024  Patient name: Evelyn Ortiz  : 1955  MRN: [id number]  Referring provider: Luis Antonio Sanabria MD  Dx:   Encounter Diagnosis     ICD-10-CM    1. Neck stiffness  M43.6       2. Pain of left upper extremity  M79.602       3. Cervical radiculopathy  M54.12                      Assessment  Assessment details: Problem List:  1) hypomobility of cervical and thoracic spine - addressing with manual mobility exercises, self stretching, CPA thoracic spine   2) decreased neuromuscular control cervicothoracic spine - addressing with prone ITY, scap 4, cervical retractions, postural control    Evelyn Ortiz is a pleasant 68 y.o. female who presents with complaints of left sided neck, shoulder, and arm pain. She has limited cervical rotation L>R, limited cervical side bending bilaterally, decreased thoracic mobility, poor postural control, and pain resulting in decreased functional tolerance and decreased ability to perform recreational activities such as swimming.  No further referral appears necessary at this time based upon examination results.  I expect she will benefit from physical therapy to address functional deficits.        Comparable signs:  1) radicular symptoms left arm  2) cervical mobility  3) periscapular strength   4) thoracic mobility    Impairments: activity intolerance, impaired physical strength, lacks appropriate home exercise program, pain with function and poor posture     Symptom irritability: low  Goals  Short Term Goals: to be achieved by 4 weeks  1) Patient to be independent with basic HEP.  2) Decrease pain to 3/10 at its worst.  3) Increase cervical spine ROM by 5-10 degrees in all deficient planes.  4) Increase UE strength by 1/2 MMT grade in all deficient planes.  5) Improve joint mobility in cervical spine to normal     Long Term Goals: to be achieved by discharge  1) FOTO equal to or greater than 69.  2) Patient to be independent with comprehensive  HEP.  3) Cervical spine ROM WNL all planes to improve a/iadls.  4) Increase UE strength to 1 MMT grade in all planes to improve a/iadls.  5) Lifting is improved to maximal level of function  6) Patient able to swim without symptoms.        Plan  Plan details: Address physical impairments found during IE via therapeutic exercises, neuromuscular re-education, and manual therapy to improve functional tolerance. Develop HEP for self-management of symptoms.    Patient would benefit from: skilled physical therapy  Referral necessary: No  Planned therapy interventions: joint mobilization, nerve gliding, neuromuscular re-education, patient education, postural training, strengthening, stretching, therapeutic activities, therapeutic exercise, flexibility and home exercise program  Frequency: 2x week  Duration in visits: 16  Duration in weeks: 8  Plan of Care beginning date: 3/25/2024  Plan of Care expiration date: 2024  Treatment plan discussed with: patient        Subjective Evaluation    History of Present Illness  Mechanism of injury: Patient reports having radiculopathy in her neck about 3 years ago which she had an epidural through [facility] that seemed to help. She feels the symptoms are coming back, returned to [facility] where she had an xray. She was instructed to try PT and then return for MRI if no improvement. Pain is in left side of neck and right below elbow. Reports the pain is all the time but enough to address it. Swimming aggravates it which she has decreased frequency.           Recurrent probem    Quality of life: good    Patient Goals  Patient goals for therapy: decreased pain, return to sport/leisure activities and increased strength  Patient goal: return to swimming without pain  Pain  Current pain rating: 3  At best pain ratin  At worst pain ratin  Location: left cervical and radiating into arm below elbow  Quality: radiating and dull ache  Relieving factors: medications  Aggravating factors:  lifting and overhead activity (swimming)  Progression: worsening      Diagnostic Tests  X-ray: normal  Treatments  Previous treatment: injection treatment and medication        Objective     Concurrent Complaints  Negative for night pain, disturbed sleep and headaches    Postural Observations  Seated posture: poor  Standing posture: fair  Correction of posture: makes symptoms better      Palpation   Left   Tenderness of the upper trapezius.     Right   Tenderness of the upper trapezius.     Neurological Testing     Reflexes   Left   Biceps (C5/C6): brisk (3+)  Brachioradialis (C6): brisk (3+)  Triceps (C7): brisk (3+)    Right   Biceps (C5/C6): brisk (3+)  Brachioradialis (C6): brisk (3+)  Triceps (C7): brisk (3+)    Active Range of Motion   Cervical/Thoracic Spine       Cervical    Left lateral flexion:  Restriction level: moderate  Right lateral flexion:  with pain Restriction level moderate  Left rotation:  with pain Restriction level: moderate  Right rotation:  Restriction level: moderate  Left Shoulder   Normal active range of motion    Right Shoulder   Normal active range of motion    Joint Play     Hypomobile: T3, T4, T5, T6, T7 and T8   Mechanical Assessment    Cervical    Seated retraction: repeated movements   Pain location: centralized  Pain intensity: better  Pain level: decreased    Thoracic      Lumbar      Strength/Myotome Testing     Left Shoulder     Planes of Motion   Flexion: 5   Abduction: 5   External rotation at 0°: 5   Internal rotation at 0°: 5     Isolated Muscles   Lower trapezius: 4- (!)   Middle trapezius: 4     Right Shoulder     Planes of Motion   Flexion: 5   Abduction: 5   External rotation at 0°: 5   Internal rotation at 0°: 5     Isolated Muscles   Lower trapezius: 4-   Middle trapezius: 4     Tests   Cervical   Positive cervical distraction test and neck flexor muscle endurance test.  Negative vertical compression.     Left Shoulder   Positive ULTT3 and cervical rotation lateral  flexion.   Negative belly press, Hawkin's, painful arc, ULTT1, ULTT2 and ULTT4.     Right Shoulder   Positive cervical rotation lateral flexion.   Negative belly press, Hawkin's and painful arc.     Lumbar   Negative vertical compression.   Neuro Exam:     Headaches   Patient reports headaches: No.              Precautions: Osteoporosis/osteopenia      Manuals 3/25            Cervical SG             UT stretch             Rot mobs                           Neuro Re-Ed             Supine cervical retracttion             Scap squeeze             Prone ITY                                                                 Ther Ex             Seated cervical retraction Hep with extension            SNAG rot B             Pt education CS            UBE - activity tolerance              Posture correction HEP                                                   Ther Activity                                       Gait Training                                       Modalities

## 2024-03-29 ENCOUNTER — OFFICE VISIT (OUTPATIENT)
Dept: PHYSICAL THERAPY | Facility: CLINIC | Age: 69
End: 2024-03-29
Payer: MEDICARE

## 2024-03-29 DIAGNOSIS — M43.6 NECK STIFFNESS: Primary | ICD-10-CM

## 2024-03-29 DIAGNOSIS — M79.602 PAIN OF LEFT UPPER EXTREMITY: ICD-10-CM

## 2024-03-29 DIAGNOSIS — M54.12 CERVICAL RADICULOPATHY: ICD-10-CM

## 2024-03-29 PROCEDURE — 97110 THERAPEUTIC EXERCISES: CPT

## 2024-03-29 NOTE — PROGRESS NOTES
Daily Note     Today's date: 3/29/2024  Patient name: Evelyn Ortiz  : 1955  MRN: [id number]  Referring provider: Luis Antonio Sanabria MD  Dx:   Encounter Diagnosis     ICD-10-CM    1. Neck stiffness  M43.6       2. Pain of left upper extremity  M79.602       3. Cervical radiculopathy  M54.12                      Subjective: Patient reports thinking her neck/arm pain is related to swimming, has stopped swimming for now but not happy about it because she feels like it's a good workout and good for her mental health.       Objective: See treatment diary below      Assessment:   Problem List:  1) hypomobility of cervical and thoracic spine - addressing with manual mobility exercises, self stretching, CPA thoracic spine   2) decreased neuromuscular control cervicothoracic spine - addressing with prone ITY, scap 4, cervical retractions, postural control    Evelyn Ortiz is a pleasant 68 y.o. female who presents with complaints of left sided neck, shoulder, and arm pain. She has limited cervical rotation L>R, limited cervical side bending bilaterally, decreased thoracic mobility, poor postural control, and pain resulting in decreased functional tolerance and decreased ability to perform recreational activities such as swimming.  No further referral appears necessary at this time based upon examination results.  I expect she will benefit from physical therapy to address functional deficits.        Comparable signs:  1) radicular symptoms left arm  2) cervical mobility  3) periscapular strength   4) thoracic mobility    Goals  Short Term Goals: to be achieved by 4 weeks  1) Patient to be independent with basic HEP.  2) Decrease pain to 3/10 at its worst.  3) Increase cervical spine ROM by 5-10 degrees in all deficient planes.  4) Increase UE strength by 1/2 MMT grade in all deficient planes.  5) Improve joint mobility in cervical spine to normal     Long Term Goals: to be achieved by discharge  1) FOTO equal to or greater  "than 69.  2) Patient to be independent with comprehensive HEP.  3) Cervical spine ROM WNL all planes to improve a/iadls.  4) Increase UE strength to 1 MMT grade in all planes to improve a/iadls.  5) Lifting is improved to maximal level of function  6) Patient able to swim without symptoms.     Tolerated treatment well. Patient demonstrated improvement in symptoms with repeated cervical retraction with extension, though produced some tingling in left hand. Performed repeated sidebending to left which resolved tingling, improvement in arm pain and centralization to neck. Performed cervical SNAGs to improve cervical rotation with favorable response. Patient would benefit from continued PT to address remaining deficits and improve function.       Plan: Progress treatment as tolerated.       Precautions: Osteoporosis/osteopenia      Manuals 3/25 3/29           Cervical SG             UT stretch             Rot mobs                           Neuro Re-Ed             Supine cervical retracttion             Scap squeeze             Prone ITY                                                                 Ther Ex             Seated cervical retraction Hep with extension 3x10 with ext           Repeated left sidebending   3x10 ptOP           SNAG rot B  10x10\"           Pt education CS            UBE - activity tolerance   2'/2'           Posture correction HEP            UT stretch  L 3x30\"                                      Ther Activity                                       Gait Training                                       Modalities                                            "

## 2024-03-29 NOTE — PROGRESS NOTES
PT Evaluation     Today's date: 3/29/2024  Patient name: Evelyn Ortiz  : 1955  MRN: [id number]  Referring provider: Luis Antonio Sanabria MD  Dx:   No diagnosis found.                 Assessment  Assessment details: Problem List:  1) hypomobility of cervical and thoracic spine - addressing with manual mobility exercises, self stretching, CPA thoracic spine   2) decreased neuromuscular control cervicothoracic spine - addressing with prone ITY, scap 4, cervical retractions, postural control    Evelyn Ortiz is a pleasant 68 y.o. female who presents with complaints of left sided neck, shoulder, and arm pain. She has limited cervical rotation L>R, limited cervical side bending bilaterally, decreased thoracic mobility, poor postural control, and pain resulting in decreased functional tolerance and decreased ability to perform recreational activities such as swimming.  No further referral appears necessary at this time based upon examination results.  I expect she will benefit from physical therapy to address functional deficits.        Comparable signs:  1) radicular symptoms left arm  2) cervical mobility  3) periscapular strength   4) thoracic mobility    Impairments: activity intolerance, impaired physical strength, lacks appropriate home exercise program, pain with function and poor posture     Symptom irritability: low  Goals  Short Term Goals: to be achieved by 4 weeks  1) Patient to be independent with basic HEP.  2) Decrease pain to 3/10 at its worst.  3) Increase cervical spine ROM by 5-10 degrees in all deficient planes.  4) Increase UE strength by 1/2 MMT grade in all deficient planes.  5) Improve joint mobility in cervical spine to normal     Long Term Goals: to be achieved by discharge  1) FOTO equal to or greater than 69.  2) Patient to be independent with comprehensive HEP.  3) Cervical spine ROM WNL all planes to improve a/iadls.  4) Increase UE strength to 1 MMT grade in all planes to improve  a/iadls.  5) Lifting is improved to maximal level of function  6) Patient able to swim without symptoms.        Plan  Plan details: Address physical impairments found during IE via therapeutic exercises, neuromuscular re-education, and manual therapy to improve functional tolerance. Develop HEP for self-management of symptoms.    Patient would benefit from: skilled physical therapy  Referral necessary: No  Planned therapy interventions: joint mobilization, nerve gliding, neuromuscular re-education, patient education, postural training, strengthening, stretching, therapeutic activities, therapeutic exercise, flexibility and home exercise program  Frequency: 2x week  Duration in visits: 16  Duration in weeks: 8  Plan of Care beginning date: 3/25/2024  Plan of Care expiration date: 2024  Treatment plan discussed with: patient      Subjective Evaluation    History of Present Illness  Mechanism of injury: Patient reports having radiculopathy in her neck about 3 years ago which she had an epidural through [facility] that seemed to help. She feels the symptoms are coming back, returned to [facility] where she had an xray. She was instructed to try PT and then return for MRI if no improvement. Pain is in left side of neck and right below elbow. Reports the pain is all the time but enough to address it. Swimming aggravates it which she has decreased frequency.           Recurrent probem    Quality of life: good    Patient Goals  Patient goals for therapy: decreased pain, return to sport/leisure activities and increased strength  Patient goal: return to swimming without pain  Pain  Current pain rating: 3  At best pain ratin  At worst pain ratin  Location: left cervical and radiating into arm below elbow  Quality: radiating and dull ache  Relieving factors: medications  Aggravating factors: lifting and overhead activity (swimming)  Progression: worsening      Diagnostic Tests  X-ray: normal  Treatments  Previous  treatment: injection treatment and medication      Objective     Concurrent Complaints  Negative for night pain, disturbed sleep and headaches    Postural Observations  Seated posture: poor  Standing posture: fair  Correction of posture: makes symptoms better      Palpation   Left   Tenderness of the upper trapezius.     Right   Tenderness of the upper trapezius.     Neurological Testing     Reflexes   Left   Biceps (C5/C6): brisk (3+)  Brachioradialis (C6): brisk (3+)  Triceps (C7): brisk (3+)    Right   Biceps (C5/C6): brisk (3+)  Brachioradialis (C6): brisk (3+)  Triceps (C7): brisk (3+)    Active Range of Motion   Cervical/Thoracic Spine       Cervical    Left lateral flexion:  Restriction level: moderate  Right lateral flexion:  with pain Restriction level moderate  Left rotation:  with pain Restriction level: moderate  Right rotation:  Restriction level: moderate  Left Shoulder   Normal active range of motion    Right Shoulder   Normal active range of motion    Joint Play     Hypomobile: T3, T4, T5, T6, T7 and T8   Mechanical Assessment    Cervical    Seated retraction: repeated movements   Pain location: centralized  Pain intensity: better  Pain level: decreased    Thoracic      Lumbar      Strength/Myotome Testing     Left Shoulder     Planes of Motion   Flexion: 5   Abduction: 5   External rotation at 0°: 5   Internal rotation at 0°: 5     Isolated Muscles   Lower trapezius: 4- (!)   Middle trapezius: 4     Right Shoulder     Planes of Motion   Flexion: 5   Abduction: 5   External rotation at 0°: 5   Internal rotation at 0°: 5     Isolated Muscles   Lower trapezius: 4-   Middle trapezius: 4     Tests   Cervical   Positive cervical distraction test and neck flexor muscle endurance test.  Negative vertical compression.     Left Shoulder   Positive ULTT3 and cervical rotation lateral flexion.   Negative belly press, Hawkin's, painful arc, ULTT1, ULTT2 and ULTT4.     Right Shoulder   Positive cervical rotation  lateral flexion.   Negative belly press, Hawkin's and painful arc.     Lumbar   Negative vertical compression.   Neuro Exam:     Headaches   Patient reports headaches: No.              Precautions: Osteoporosis/osteopenia      Manuals 3/25            Cervical SG             UT stretch             Rot mobs                           Neuro Re-Ed             Supine cervical retracttion             Scap squeeze             Prone ITY                                                                 Ther Ex             Seated cervical retraction Hep with extension            SNAG rot B             Pt education CS            UBE - activity tolerance              Posture correction HEP                                                   Ther Activity                                       Gait Training                                       Modalities                                          Daily Note     Today's date: 3/29/2024  Patient name: Evelyn Ortiz  : 1955  MRN: [id number]  Referring provider: Luis Antonio Sanabria MD  Dx: No diagnosis found.               Subjective: ***      Objective: See treatment diary below      Assessment: Tolerated treatment {Tolerated treatment :4859136974}. Patient {assessment:[id number]}      Plan: {PLAN:0445104503}     Precautions: Osteoporosis/osteopenia      Manuals 3/25            Cervical SG             UT stretch             Rot mobs                           Neuro Re-Ed             Supine cervical retracttion             Scap squeeze             Prone ITY                                                                 Ther Ex             Seated cervical retraction Hep with extension            SNAG rot B             Pt education CS            UBE - activity tolerance              Posture correction HEP                                                   Ther Activity                                       Gait Training                                       Modalities

## 2024-04-01 ENCOUNTER — OFFICE VISIT (OUTPATIENT)
Dept: PHYSICAL THERAPY | Facility: CLINIC | Age: 69
End: 2024-04-01
Payer: MEDICARE

## 2024-04-01 DIAGNOSIS — M54.12 CERVICAL RADICULOPATHY: ICD-10-CM

## 2024-04-01 DIAGNOSIS — M79.602 PAIN OF LEFT UPPER EXTREMITY: ICD-10-CM

## 2024-04-01 DIAGNOSIS — M43.6 NECK STIFFNESS: Primary | ICD-10-CM

## 2024-04-01 PROCEDURE — 97110 THERAPEUTIC EXERCISES: CPT

## 2024-04-01 PROCEDURE — 97112 NEUROMUSCULAR REEDUCATION: CPT

## 2024-04-01 PROCEDURE — 97140 MANUAL THERAPY 1/> REGIONS: CPT

## 2024-04-01 NOTE — PROGRESS NOTES
"Daily Note     Today's date: 2024  Patient name: Evelyn Ortiz  : 1955  MRN: [id number]  Referring provider: Luis Antonio Sanabria MD  Dx:   Encounter Diagnosis     ICD-10-CM    1. Neck stiffness  M43.6       2. Pain of left upper extremity  M79.602       3. Cervical radiculopathy  M54.12                        Subjective: Patient reports not as much pain in neck though still feels like \"it's always there\". Still hasn't returned to swimming but wants to return.       Objective: See treatment diary below      Assessment:   Problem List:  1) hypomobility of cervical and thoracic spine - addressing with manual mobility exercises, self stretching, CPA thoracic spine   2) decreased neuromuscular control cervicothoracic spine - addressing with prone ITY, scap 4, cervical retractions, postural control    Evelyn Ortiz is a pleasant 68 y.o. female who presents with complaints of left sided neck, shoulder, and arm pain. She has limited cervical rotation L>R, limited cervical side bending bilaterally, decreased thoracic mobility, poor postural control, and pain resulting in decreased functional tolerance and decreased ability to perform recreational activities such as swimming.  No further referral appears necessary at this time based upon examination results.  I expect she will benefit from physical therapy to address functional deficits.        Comparable signs:  1) radicular symptoms left arm  2) cervical mobility  3) periscapular strength   4) thoracic mobility    Goals  Short Term Goals: to be achieved by 4 weeks  1) Patient to be independent with basic HEP.  2) Decrease pain to 3/10 at its worst.  3) Increase cervical spine ROM by 5-10 degrees in all deficient planes.  4) Increase UE strength by 1/2 MMT grade in all deficient planes.  5) Improve joint mobility in cervical spine to normal     Long Term Goals: to be achieved by discharge  1) FOTO equal to or greater than 69.  2) Patient to be independent with " "comprehensive HEP.  3) Cervical spine ROM WNL all planes to improve a/iadls.  4) Increase UE strength to 1 MMT grade in all planes to improve a/iadls.  5) Lifting is improved to maximal level of function  6) Patient able to swim without symptoms.     Tolerated treatment well. Patient demonstrated improvement in cervical mobility and symptoms following manual therapy and repeated cervical retractions with cervical extension. Continues to have mild symptoms in elbow that she believes to be arthritis, though decreased cervical symptoms following treatment. Patient would benefit from continued PT to address remaining deficits and improve function.       Plan: Progress treatment as tolerated.       Precautions: Osteoporosis/osteopenia      Manuals 3/25 3/29 4/1          Cervical SG   CS          UT stretch   CS          Rot mobs    CS                       Neuro Re-Ed             Supine cervical retracttion             Scap squeeze             Prone ITY             Scap 4   Gtb 2x10x3\" rows and low rows                                                  Ther Ex             Seated cervical retraction Hep with extension 3x10 with ext 2X10 with ext          Repeated left sidebending   3x10 ptOP           SNAG rot B  10x10\" 10x10\" B          Pt education CS            UBE - activity tolerance   2'/2' 2'/2'          Posture correction HEP            UT stretch  L 3x30\"            Supine cerv ret with ext   PTOP x10                       Ther Activity                                       Gait Training                                       Modalities                                            "

## 2024-04-02 ENCOUNTER — APPOINTMENT (OUTPATIENT)
Dept: PHYSICAL THERAPY | Facility: CLINIC | Age: 69
End: 2024-04-02
Payer: MEDICARE

## 2024-04-05 ENCOUNTER — APPOINTMENT (OUTPATIENT)
Dept: PHYSICAL THERAPY | Facility: CLINIC | Age: 69
End: 2024-04-05
Payer: MEDICARE

## 2024-04-08 ENCOUNTER — OFFICE VISIT (OUTPATIENT)
Dept: PHYSICAL THERAPY | Facility: CLINIC | Age: 69
End: 2024-04-08
Payer: MEDICARE

## 2024-04-08 DIAGNOSIS — M54.12 CERVICAL RADICULOPATHY: ICD-10-CM

## 2024-04-08 DIAGNOSIS — M79.602 PAIN OF LEFT UPPER EXTREMITY: ICD-10-CM

## 2024-04-08 DIAGNOSIS — M43.6 NECK STIFFNESS: Primary | ICD-10-CM

## 2024-04-08 PROCEDURE — 97140 MANUAL THERAPY 1/> REGIONS: CPT

## 2024-04-08 PROCEDURE — 97112 NEUROMUSCULAR REEDUCATION: CPT

## 2024-04-08 PROCEDURE — 97110 THERAPEUTIC EXERCISES: CPT

## 2024-04-08 NOTE — PROGRESS NOTES
Daily Note     Today's date: 2024  Patient name: Evelyn Ortiz  : 1955  MRN: [id number]  Referring provider: Luis Antonio Sanabria MD  Dx:   Encounter Diagnosis     ICD-10-CM    1. Neck stiffness  M43.6       2. Pain of left upper extremity  M79.602       3. Cervical radiculopathy  M54.12                          Subjective: Patient reports getting MRI this morning, showed increase in degenerative changes. Previously had MRI in  in which she received CSI for the stenosis, wants to get injection again because it provided relief for 3 years and she was able to swim. Currently unable to swim because of pain and is very upset by this, having pain in neck and tingling in left hand. Patient wants to follow up with doctor before deciding to continue with therapy or not.       Objective: See treatment diary below      Assessment:   Problem List:  1) hypomobility of cervical and thoracic spine - addressing with manual mobility exercises, self stretching, CPA thoracic spine   2) decreased neuromuscular control cervicothoracic spine - addressing with prone ITY, scap 4, cervical retractions, postural control    Evelyn Ortiz is a pleasant 68 y.o. female who presents with complaints of left sided neck, shoulder, and arm pain. She has limited cervical rotation L>R, limited cervical side bending bilaterally, decreased thoracic mobility, poor postural control, and pain resulting in decreased functional tolerance and decreased ability to perform recreational activities such as swimming.  No further referral appears necessary at this time based upon examination results.  I expect she will benefit from physical therapy to address functional deficits.        Comparable signs:  1) radicular symptoms left arm - no change  2) cervical mobility - improving   3) periscapular strength   4) thoracic mobility    Goals  Short Term Goals: to be achieved by 4 weeks  1) Patient to be independent with basic HEP.  2) Decrease pain to 3/10  "at its worst.  3) Increase cervical spine ROM by 5-10 degrees in all deficient planes.  4) Increase UE strength by 1/2 MMT grade in all deficient planes.  5) Improve joint mobility in cervical spine to normal     Long Term Goals: to be achieved by discharge  1) FOTO equal to or greater than 69.  2) Patient to be independent with comprehensive HEP.  3) Cervical spine ROM WNL all planes to improve a/iadls.  4) Increase UE strength to 1 MMT grade in all planes to improve a/iadls.  5) Lifting is improved to maximal level of function  6) Patient able to swim without symptoms.     Tolerated treatment well. Patient continues to demonstrate tingling in left arm with little change following treatment. However, patient demonstrates improvement in cervical and thoracic mobility following manual therapy and mobility based exercises. Updated HEP for progression of thoracic mobility. Patient would benefit from continued PT to address remaining deficits and improve function.       Plan: Progress treatment as tolerated.       Precautions: Osteoporosis/osteopenia      Manuals 3/25 3/29 4/1 4/8         Cervical SG   CS CS         UT stretch   CS CS         Rot mobs    CS                       Neuro Re-Ed             Supine cervical retracttion             Scap squeeze             Prone ITY             Scap 4   Gtb 2x10x3\" rows and low rows  Btb 2x10x3\" rows and low rows                                                 Ther Ex             Seated cervical retraction Hep with extension 3x10 with ext 2X10 with ext X10 with ext    Supine 20x5\"         Repeated left sidebending   3x10 ptOP           SNAG rot B  10x10\" 10x10\" B          Pt education CS            UBE - activity tolerance   2'/2' 2'/2' Bike 10'         Posture correction HEP            UT stretch  L 3x30\"            Supine cerv ret with ext   PTOP x10          Seated thoracic ext    2x10x5\"         Open books    10x         Ther Activity                                     "   Gait Training                                       Modalities

## 2024-04-10 ENCOUNTER — OFFICE VISIT (OUTPATIENT)
Dept: PHYSICAL THERAPY | Facility: CLINIC | Age: 69
End: 2024-04-10
Payer: MEDICARE

## 2024-04-10 DIAGNOSIS — M43.6 NECK STIFFNESS: Primary | ICD-10-CM

## 2024-04-10 DIAGNOSIS — M79.602 PAIN OF LEFT UPPER EXTREMITY: ICD-10-CM

## 2024-04-10 DIAGNOSIS — M54.12 CERVICAL RADICULOPATHY: ICD-10-CM

## 2024-04-10 PROCEDURE — 97110 THERAPEUTIC EXERCISES: CPT

## 2024-04-10 PROCEDURE — 97140 MANUAL THERAPY 1/> REGIONS: CPT

## 2024-04-10 PROCEDURE — 97112 NEUROMUSCULAR REEDUCATION: CPT

## 2024-04-10 NOTE — PROGRESS NOTES
"Daily Note     Today's date: 4/10/2024  Patient name: Evelyn Ortiz  : 1955  MRN: [id number]  Referring provider: Luis Antonio Sanabria MD  Dx:   Encounter Diagnosis     ICD-10-CM    1. Neck stiffness  M43.6       2. Pain of left upper extremity  M79.602       3. Cervical radiculopathy  M54.12                            Subjective: Reports getting same amount of tingling but \"it's not horrible, it's just there\". Feels like the exercises are \"making me more flexible in my neck\".       Objective: See treatment diary below      Assessment:   Problem List:  1) hypomobility of cervical and thoracic spine - addressing with manual mobility exercises, self stretching, CPA thoracic spine   2) decreased neuromuscular control cervicothoracic spine - addressing with prone ITY, scap 4, cervical retractions, postural control    Evelyn Ortiz is a pleasant 68 y.o. female who presents with complaints of left sided neck, shoulder, and arm pain. She has limited cervical rotation L>R, limited cervical side bending bilaterally, decreased thoracic mobility, poor postural control, and pain resulting in decreased functional tolerance and decreased ability to perform recreational activities such as swimming.  No further referral appears necessary at this time based upon examination results.  I expect she will benefit from physical therapy to address functional deficits.        Comparable signs:  1) radicular symptoms left arm - no change  2) cervical mobility - improving   3) periscapular strength   4) thoracic mobility    Goals  Short Term Goals: to be achieved by 4 weeks  1) Patient to be independent with basic HEP.  2) Decrease pain to 3/10 at its worst.  3) Increase cervical spine ROM by 5-10 degrees in all deficient planes.  4) Increase UE strength by 1/2 MMT grade in all deficient planes.  5) Improve joint mobility in cervical spine to normal     Long Term Goals: to be achieved by discharge  1) FOTO equal to or greater than 69.  2) " "Patient to be independent with comprehensive HEP.  3) Cervical spine ROM WNL all planes to improve a/iadls.  4) Increase UE strength to 1 MMT grade in all planes to improve a/iadls.  5) Lifting is improved to maximal level of function  6) Patient able to swim without symptoms.     Tolerated treatment well. Patient improving cervical and thoracic mobility with HEP and manual therapy within treatment, however, no real change in symptoms in left UE. Patient demonstrates good form with exercises and will continue to work on cervicothoracic mobility at home. Patient would benefit from continued PT to address remaining deficits and improve function.       Plan: Progress treatment as tolerated.  Will determine if wants to continue therapy following follow up with doctor and CSI.      Precautions: Osteoporosis/osteopenia      Manuals 3/25 3/29 4/1 4/8 4/10        Cervical SG   CS CS CS        UT stretch   CS CS         Rot mobs    CS                       Neuro Re-Ed             Supine cervical retracttion     20x5\" with lift        Scap squeeze             Prone ITY     2x10 Y R, x10 L         Scap 4   Gtb 2x10x3\" rows and low rows  Btb 2x10x3\" rows and low rows  Btb 3x10x3\" rows and low rows                                                Ther Ex             Seated cervical retraction Hep with extension 3x10 with ext 2X10 with ext X10 with ext    Supine 20x5\"         Repeated left sidebending   3x10 ptOP           SNAG rot B  10x10\" 10x10\" B          Pt education CS            UBE - activity tolerance   2'/2' 2'/2' Bike 10' 2'/2'        Posture correction HEP            UT stretch  L 3x30\"            Supine cerv ret with ext   PTOP x10          Seated thoracic ext    2x10x5\" 20x5\"         Open books    10x 10x B        Ther Activity                                       Gait Training                                       Modalities                                            "

## 2024-04-12 ENCOUNTER — APPOINTMENT (OUTPATIENT)
Dept: PHYSICAL THERAPY | Facility: CLINIC | Age: 69
End: 2024-04-12
Payer: MEDICARE

## 2024-07-09 ENCOUNTER — EVALUATION (OUTPATIENT)
Dept: PHYSICAL THERAPY | Facility: CLINIC | Age: 69
End: 2024-07-09
Payer: MEDICARE

## 2024-07-09 DIAGNOSIS — M43.6 NECK STIFFNESS: ICD-10-CM

## 2024-07-09 DIAGNOSIS — M25.512 CHRONIC LEFT SHOULDER PAIN: Primary | ICD-10-CM

## 2024-07-09 DIAGNOSIS — G89.29 CHRONIC LEFT SHOULDER PAIN: Primary | ICD-10-CM

## 2024-07-09 PROCEDURE — 97110 THERAPEUTIC EXERCISES: CPT

## 2024-07-09 PROCEDURE — 97140 MANUAL THERAPY 1/> REGIONS: CPT

## 2024-07-09 PROCEDURE — 97161 PT EVAL LOW COMPLEX 20 MIN: CPT

## 2024-07-09 NOTE — LETTER
2024    Luis Antonio Sanabria MD  [facility] [employer] [facility] [place] [address]    Patient: Evelyn Ortiz   YOB: 1955   Date of Visit: 2024     Encounter Diagnosis     ICD-10-CM    1. Chronic left shoulder pain  M25.512     G89.29       2. Neck stiffness  M43.6           Dear Dr. Sanabria:    Thank you for your recent referral of Evelyn Ortiz. Please review the attached evaluation summary from Evelyn's recent visit.     Please verify that you agree with the plan of care by signing the attached order.     If you have any questions or concerns, please do not hesitate to call.     I sincerely appreciate the opportunity to share in the care of one of your patients and hope to have another opportunity to work with you in the near future.       Sincerely,    Marlene Denny, PT      Referring Provider:      I certify that I have read the below Plan of Care and certify the need for these services furnished under this plan of treatment while under my care.                    Luis Antonio Sanabria MD  [facility] [employer] [facility] [place] [address]  Via Fax: [phone]          PT Evaluation     Today's date: 2024  Patient name: Evelyn Ortiz  : 1955  MRN: [id number]  Referring provider: Luis Antonio Sanabria MD  Dx:   Encounter Diagnosis     ICD-10-CM    1. Chronic left shoulder pain  M25.512     G89.29       2. Neck stiffness  M43.6                      Assessment  Impairments: abnormal or restricted ROM, activity intolerance, impaired physical strength, lacks appropriate home exercise program, pain with function and poor posture   Symptom irritability: low    Assessment details: Problem List:  1) hypomobility cervicothoracic spine - repeated motions, thoracic mobility exercises  2) decreased neuromuscular control L UE - STM, functional periscapular training     Evelyn Ortiz is a pleasant 68 y.o. female who presents with complaints of left shoulder pain while performing breast  stroke during swimming. She has tightness in left subscapularis and teres major, hypomobility of cervical spine, and slight increase in neural tension on left UE resulting in inability to perform functional activities and recreation such as swimming.  No further referral appears necessary at this time based upon examination results.  I expect she will benefit from physical therapy to address functional limitations.        Comparable signs:  1) breast stroke  2) STM subscapularis and teres major  Understanding of Dx/Px/POC: good     Prognosis: good    Goals  Short Term Goals: to be achieved by 4 weeks  1) Patient to be independent with basic HEP.  2) Decrease pain to 5/10 at its worst.  3) Increase cervical spine ROM by 5-10 degrees in all deficient planes.  4) Increase UE strength by 1/2 MMT grade in all deficient planes.  5) Improve joint mobility in cervical spine to normal   6) Patient to return to swimming for 10 min without limited to pain.      Long Term Goals: to be achieved by discharge  1) FOTO equal to or greater than 74.  2) Patient to be independent with comprehensive HEP.  3) Cervical spine ROM WNL all planes to improve a/iadls.  4) Increase UE strength to 1 MMT grade in all planes to improve a/iadls.  5) Lifting is improved to maximal level of function  6) Patient to return to swimming for 30 min without limited to pain.         Plan  Patient would benefit from: skilled physical therapy  Referral necessary: No    Planned therapy interventions: joint mobilization, manual therapy, massage, neuromuscular re-education, patient/caregiver education, postural training, strengthening, stretching, therapeutic activities, therapeutic exercise, therapeutic training, home exercise program and flexibility    Frequency: 2x week  Duration in weeks: 8  Plan of Care beginning date: 7/9/2024  Plan of Care expiration date: 8/27/2024  Treatment plan discussed with: patient  Plan details: Address physical impairments found  during IE via therapeutic exercises, neuromuscular re-education, and manual therapy to improve functional tolerance. Develop HEP for self-management of symptoms.        Subjective Evaluation    History of Present Illness  Mechanism of injury: Patient reports she had the epidural injection in her cervical spine in May which she says helped about 50%. Still having some pain in left shoulder that limits her swimming, used to swim laps but hasn't been able to do laps for around 5 months. Still doing some water aerobics. Denies numbness.tingling down arm saying that the CSI helped. Most pain is in the front of her shoulder. Most of her shoulder pain is during swimming but feels she has full motion. The pain that stops her from swimming is a throbbing pain but goes away afterwards with Tylenol or Advil. Denies difficulty sleeping.           Recurrent probem    Quality of life: good    Patient Goals  Patient goals for therapy: decreased pain, increased motion, increased strength and return to sport/leisure activities  Patient goal: return to swimming  Pain  Current pain rating: 3  At best pain ratin  At worst pain ratin  Location: left anteriot shoulder  Quality: throbbing  Relieving factors: rest, medications and change in position  Exacerbated by: swimming.  Progression: improved      Diagnostic Tests  No diagnostic tests performed  Treatments  Previous treatment: physical therapy and injection treatment (cervical CSI)        Objective     Concurrent Complaints  Negative for night pain and disturbed sleep    Postural Observations  Seated posture: fair  Standing posture: fair      Palpation     Additional Palpation Details  Tender posterior left rotator cuff/tricep insertion     Neurological Testing     Sensation   Cervical/Thoracic   Left   Intact: light touch    Right   Intact: light touch    Shoulder   Left Shoulder   Intact: light touch    Right Shoulder   Intact: Light touch    Reflexes   Left   Biceps  (C5/C6): normal (2+)  Brachioradialis (C6): normal (2+)  Triceps (C7): normal (2+)  De La Cruz's reflex: negative    Right   Biceps (C5/C6): normal (2+)  Brachioradialis (C6): normal (2+)  Triceps (C7): normal (2+)  De La Cruz's reflex: negative    Active Range of Motion   Cervical/Thoracic Spine       Cervical    Flexion:  Restriction level: minimal  Extension:  Restriction level: minimal  Left lateral flexion:  Restriction level: minimal  Right lateral flexion:  Restriction level minimal  Left rotation:  Restriction level: minimal  Right rotation:  Restriction level: minimal  Mechanical Assessment    Cervical      Thoracic    Seated extension: repeated movements  Pain location: no change    Lumbar      Strength/Myotome Testing   Cervical Spine     Left   Interossei strength (t1): 5    Right   Interossei strength (t1): 5    Left Shoulder     Planes of Motion   Flexion: 5   Abduction: 5   External rotation at 0°: 4+   Internal rotation at 0°: 4+     Right Shoulder     Planes of Motion   Flexion: 5   Abduction: 5   External rotation at 0°: 4+   Internal rotation at 0°: 4+     Left Elbow   Flexion: 5  Extension: 5    Right Elbow   Flexion: 5  Extension: 5    Tests   Cervical     Left   Negative Spurling's Test A.     Right   Negative Spurling's Test A.     Left Shoulder   Negative full can, Hawkin's, ULTT1, ULTT3 and ULTT4.     Right Shoulder   Negative full can, Hawkin's, ULTT1, ULTT3 and ULTT4.     Additional Tests Details  Mild neural tension on left but not reproductive of patient chief complaints    General Comments:    Upper quarter screen   Shoulder: unremarkable             Precautions: osteoporosis/osteopenia       Manuals 7/9            STM subscap, teres major, pec minor CS                                                   Neuro Re-Ed             Banded B ER HEP gtb            Supine cerv retr lift and hold                                                                              Ther Ex             Seated  prayer stretch HEP            Functional wall slides  HEP            LT OH activation             RB - activity tolerance              FR thoracic ext             Pt education CS                                      Ther Activity                                       Gait Training                                       Modalities

## 2024-07-09 NOTE — PROGRESS NOTES
PT Evaluation     Today's date: 2024  Patient name: Evelyn Ortiz  : 1955  MRN: [id number]  Referring provider: Luis Antonio Sanabria MD  Dx:   Encounter Diagnosis     ICD-10-CM    1. Chronic left shoulder pain  M25.512     G89.29       2. Neck stiffness  M43.6                      Assessment  Impairments: abnormal or restricted ROM, activity intolerance, impaired physical strength, lacks appropriate home exercise program, pain with function and poor posture   Symptom irritability: low    Assessment details: Problem List:  1) hypomobility cervicothoracic spine - repeated motions, thoracic mobility exercises  2) decreased neuromuscular control L UE - STM, functional periscapular training     Evelyn Ortiz is a pleasant 68 y.o. female who presents with complaints of left shoulder pain while performing breast stroke during swimming. She has tightness in left subscapularis and teres major, hypomobility of cervical spine, and slight increase in neural tension on left UE resulting in inability to perform functional activities and recreation such as swimming.  No further referral appears necessary at this time based upon examination results.  I expect she will benefit from physical therapy to address functional limitations.        Comparable signs:  1) breast stroke  2) STM subscapularis and teres major  Understanding of Dx/Px/POC: good     Prognosis: good    Goals  Short Term Goals: to be achieved by 4 weeks  1) Patient to be independent with basic HEP.  2) Decrease pain to 5/10 at its worst.  3) Increase cervical spine ROM by 5-10 degrees in all deficient planes.  4) Increase UE strength by 1/2 MMT grade in all deficient planes.  5) Improve joint mobility in cervical spine to normal   6) Patient to return to swimming for 10 min without limited to pain.      Long Term Goals: to be achieved by discharge  1) FOTO equal to or greater than 74.  2) Patient to be independent with comprehensive HEP.  3) Cervical spine ROM  WNL all planes to improve a/iadls.  4) Increase UE strength to 1 MMT grade in all planes to improve a/iadls.  5) Lifting is improved to maximal level of function  6) Patient to return to swimming for 30 min without limited to pain.         Plan  Patient would benefit from: skilled physical therapy  Referral necessary: No    Planned therapy interventions: joint mobilization, manual therapy, massage, neuromuscular re-education, patient/caregiver education, postural training, strengthening, stretching, therapeutic activities, therapeutic exercise, therapeutic training, home exercise program and flexibility    Frequency: 2x week  Duration in weeks: 8  Plan of Care beginning date: 2024  Plan of Care expiration date: 2024  Treatment plan discussed with: patient  Plan details: Address physical impairments found during IE via therapeutic exercises, neuromuscular re-education, and manual therapy to improve functional tolerance. Develop HEP for self-management of symptoms.        Subjective Evaluation    History of Present Illness  Mechanism of injury: Patient reports she had the epidural injection in her cervical spine in May which she says helped about 50%. Still having some pain in left shoulder that limits her swimming, used to swim laps but hasn't been able to do laps for around 5 months. Still doing some water aerobics. Denies numbness.tingling down arm saying that the CSI helped. Most pain is in the front of her shoulder. Most of her shoulder pain is during swimming but feels she has full motion. The pain that stops her from swimming is a throbbing pain but goes away afterwards with Tylenol or Advil. Denies difficulty sleeping.           Recurrent probem    Quality of life: good    Patient Goals  Patient goals for therapy: decreased pain, increased motion, increased strength and return to sport/leisure activities  Patient goal: return to swimming  Pain  Current pain rating: 3  At best pain ratin  At worst  pain ratin  Location: left anteriot shoulder  Quality: throbbing  Relieving factors: rest, medications and change in position  Exacerbated by: swimming.  Progression: improved      Diagnostic Tests  No diagnostic tests performed  Treatments  Previous treatment: physical therapy and injection treatment (cervical CSI)        Objective     Concurrent Complaints  Negative for night pain and disturbed sleep    Postural Observations  Seated posture: fair  Standing posture: fair      Palpation     Additional Palpation Details  Tender posterior left rotator cuff/tricep insertion     Neurological Testing     Sensation   Cervical/Thoracic   Left   Intact: light touch    Right   Intact: light touch    Shoulder   Left Shoulder   Intact: light touch    Right Shoulder   Intact: Light touch    Reflexes   Left   Biceps (C5/C6): normal (2+)  Brachioradialis (C6): normal (2+)  Triceps (C7): normal (2+)  De La Cruz's reflex: negative    Right   Biceps (C5/C6): normal (2+)  Brachioradialis (C6): normal (2+)  Triceps (C7): normal (2+)  De La Cruz's reflex: negative    Active Range of Motion   Cervical/Thoracic Spine       Cervical    Flexion:  Restriction level: minimal  Extension:  Restriction level: minimal  Left lateral flexion:  Restriction level: minimal  Right lateral flexion:  Restriction level minimal  Left rotation:  Restriction level: minimal  Right rotation:  Restriction level: minimal  Mechanical Assessment    Cervical      Thoracic    Seated extension: repeated movements  Pain location: no change    Lumbar      Strength/Myotome Testing   Cervical Spine     Left   Interossei strength (t1): 5    Right   Interossei strength (t1): 5    Left Shoulder     Planes of Motion   Flexion: 5   Abduction: 5   External rotation at 0°: 4+   Internal rotation at 0°: 4+     Right Shoulder     Planes of Motion   Flexion: 5   Abduction: 5   External rotation at 0°: 4+   Internal rotation at 0°: 4+     Left Elbow   Flexion: 5  Extension:  5    Right Elbow   Flexion: 5  Extension: 5    Tests   Cervical     Left   Negative Spurling's Test A.     Right   Negative Spurling's Test A.     Left Shoulder   Negative full can, Hawkin's, ULTT1, ULTT3 and ULTT4.     Right Shoulder   Negative full can, Hawkin's, ULTT1, ULTT3 and ULTT4.     Additional Tests Details  Mild neural tension on left but not reproductive of patient chief complaints    General Comments:    Upper quarter screen   Shoulder: unremarkable             Precautions: osteoporosis/osteopenia       Manuals 7/9            STM subscap, teres major, pec minor CS                                                   Neuro Re-Ed             Banded B ER HEP gtb            Supine cerv retr lift and hold                                                                              Ther Ex             Seated prayer stretch HEP            Functional wall slides  HEP            LT OH activation             RB - activity tolerance              FR thoracic ext             Pt education CS                                      Ther Activity                                       Gait Training                                       Modalities

## 2024-07-11 ENCOUNTER — OFFICE VISIT (OUTPATIENT)
Dept: PHYSICAL THERAPY | Facility: CLINIC | Age: 69
End: 2024-07-11
Payer: MEDICARE

## 2024-07-11 DIAGNOSIS — M43.6 NECK STIFFNESS: ICD-10-CM

## 2024-07-11 DIAGNOSIS — G89.29 CHRONIC LEFT SHOULDER PAIN: Primary | ICD-10-CM

## 2024-07-11 DIAGNOSIS — M25.512 CHRONIC LEFT SHOULDER PAIN: Primary | ICD-10-CM

## 2024-07-11 PROCEDURE — 97110 THERAPEUTIC EXERCISES: CPT

## 2024-07-11 PROCEDURE — 97112 NEUROMUSCULAR REEDUCATION: CPT

## 2024-07-11 PROCEDURE — 97140 MANUAL THERAPY 1/> REGIONS: CPT

## 2024-07-11 NOTE — PROGRESS NOTES
Daily Note     Today's date: 2024  Patient name: Evelyn Ortiz  : 1955  MRN: [id number]  Referring provider: No ref. provider found  Dx:   Encounter Diagnosis     ICD-10-CM    1. Chronic left shoulder pain  M25.512     G89.29       2. Neck stiffness  M43.6                      Subjective: Patient reports her shoulder is feeling better since last session, feels like it's looser and she can move it around a little better.       Objective: See treatment diary below      Assessment: Tolerated treatment well. Patient demonstrated improvement in shoulder motion with decreased symptoms following STM of subscapularis and teres major. Followed with additional stretching with good response. Updated HEP to reflect progressions with good understanding and form. Patient would benefit from continued PT to address remaining functional deficits.     Problem List:  1) hypomobility cervicothoracic spine - repeated motions, thoracic mobility exercises  2) decreased neuromuscular control L UE - STM, functional periscapular training     Evelyn Ortiz is a pleasant 68 y.o. female who presents with complaints of left shoulder pain while performing breast stroke during swimming. She has tightness in left subscapularis and teres major, hypomobility of cervical spine, and slight increase in neural tension on left UE resulting in inability to perform functional activities and recreation such as swimming.  No further referral appears necessary at this time based upon examination results.  I expect she will benefit from physical therapy to address functional limitations.        Comparable signs:  1) breast stroke  2) STM subscapularis and teres major  Understanding of Dx/Px/POC: good     Prognosis: good    Goals  Short Term Goals: to be achieved by 4 weeks  1) Patient to be independent with basic HEP.  2) Decrease pain to 5/10 at its worst.  3) Increase cervical spine ROM by 5-10 degrees in all deficient planes.  4) Increase UE strength  "by 1/2 MMT grade in all deficient planes.  5) Improve joint mobility in cervical spine to normal   6) Patient to return to swimming for 10 min without limited to pain.      Long Term Goals: to be achieved by discharge  1) FOTO equal to or greater than 74.  2) Patient to be independent with comprehensive HEP.  3) Cervical spine ROM WNL all planes to improve a/iadls.  4) Increase UE strength to 1 MMT grade in all planes to improve a/iadls.  5) Lifting is improved to maximal level of function  6) Patient to return to swimming for 30 min without limited to pain.       Plan: Progress treatment as tolerated.       Precautions: osteoporosis/osteopenia       Manuals 7/9 7/11           STM subscap, teres major, pec minor CS CS                                                  Neuro Re-Ed             Banded B ER HEP gtb Gtb 2x10           Supine cerv retr lift and hold             Cheerleaders  Gtb 2x10                                                                Ther Ex             Seated prayer stretch HEP 10x10\"            Functional wall slides  HEP 2x10x5\"           LT OH activation  2x10 otb            UBE - activity tolerance   2'/2'           FR thoracic ext             Pt education CS            Open book  X10 B                        Ther Activity                                       Gait Training                                       Modalities                                            "

## 2024-07-15 ENCOUNTER — OFFICE VISIT (OUTPATIENT)
Dept: PHYSICAL THERAPY | Facility: CLINIC | Age: 69
End: 2024-07-15
Payer: MEDICARE

## 2024-07-15 DIAGNOSIS — G89.29 CHRONIC LEFT SHOULDER PAIN: Primary | ICD-10-CM

## 2024-07-15 DIAGNOSIS — M25.512 CHRONIC LEFT SHOULDER PAIN: Primary | ICD-10-CM

## 2024-07-15 DIAGNOSIS — M43.6 NECK STIFFNESS: ICD-10-CM

## 2024-07-15 PROCEDURE — 97140 MANUAL THERAPY 1/> REGIONS: CPT

## 2024-07-15 PROCEDURE — 97110 THERAPEUTIC EXERCISES: CPT

## 2024-07-15 PROCEDURE — 97112 NEUROMUSCULAR REEDUCATION: CPT

## 2024-07-15 NOTE — PROGRESS NOTES
Daily Note     Today's date: 7/15/2024  Patient name: Evelyn Ortiz  : 1955  MRN: [id number]  Referring provider: Luis Antonio Sanabria MD  Dx:   Encounter Diagnosis     ICD-10-CM    1. Chronic left shoulder pain  M25.512     G89.29       2. Neck stiffness  M43.6                        Subjective: Patient reports she swam 10 laps yesterday, had some discomfort but wasn't like the pain she had before that would stop her. Still feels some soreness today but overall less than it had been before.       Objective: See treatment diary below      Assessment: Tolerated treatment well. Patient demonstrated decrease in symptoms with arm movement and improved range following STM of teres major and subscap. Followed with additional stretching and thoracic mobility exercises, and further progression of periscapular strengthening. Patient remains challenged with overhead strengthening and demonstrates mild fatigue. Patient would benefit from continued PT to address remaining functional deficits.     Problem List:  1) hypomobility cervicothoracic spine - repeated motions, thoracic mobility exercises  2) decreased neuromuscular control L UE - STM, functional periscapular training     Evelyn Ortiz is a pleasant 68 y.o. female who presents with complaints of left shoulder pain while performing breast stroke during swimming. She has tightness in left subscapularis and teres major, hypomobility of cervical spine, and slight increase in neural tension on left UE resulting in inability to perform functional activities and recreation such as swimming.  No further referral appears necessary at this time based upon examination results.  I expect she will benefit from physical therapy to address functional limitations.        Comparable signs:  1) breast stroke  2) STM subscapularis and teres major  Understanding of Dx/Px/POC: good     Prognosis: good    Goals  Short Term Goals: to be achieved by 4 weeks  1) Patient to be independent with  "basic HEP.  2) Decrease pain to 5/10 at its worst.  3) Increase cervical spine ROM by 5-10 degrees in all deficient planes.  4) Increase UE strength by 1/2 MMT grade in all deficient planes.  5) Improve joint mobility in cervical spine to normal   6) Patient to return to swimming for 10 min without limited to pain.      Long Term Goals: to be achieved by discharge  1) FOTO equal to or greater than 74.  2) Patient to be independent with comprehensive HEP.  3) Cervical spine ROM WNL all planes to improve a/iadls.  4) Increase UE strength to 1 MMT grade in all planes to improve a/iadls.  5) Lifting is improved to maximal level of function  6) Patient to return to swimming for 30 min without limited to pain.       Plan: Progress treatment as tolerated.       Precautions: osteoporosis/osteopenia       Manuals 7/9 7/11 7/15          STM subscap, teres major, pec minor CS CS CS supine and side lying                                                  Neuro Re-Ed             Banded B ER HEP gtb Gtb 2x10 Gtb 3x10           Supine cerv retr lift and hold             Cheerleaders  Gtb 2x10  Gtb 3x10           Rows   nv           Lat pulldown  nv                                     Ther Ex             Seated prayer stretch HEP 10x10\"  10x10\"           Functional wall slides  HEP 2x10x5\" 2x10x5\"           LT OH activation  2x10 otb  3x5\" otb           UBE - activity tolerance   2'/2' 2'/2'          FR thoracic ext   15x5\"           Pt education CS            Open book  X10 B X10 B                        Ther Activity                                       Gait Training                                       Modalities                                            "

## 2024-07-19 ENCOUNTER — OFFICE VISIT (OUTPATIENT)
Dept: PHYSICAL THERAPY | Facility: CLINIC | Age: 69
End: 2024-07-19
Payer: MEDICARE

## 2024-07-19 DIAGNOSIS — M43.6 NECK STIFFNESS: ICD-10-CM

## 2024-07-19 DIAGNOSIS — G89.29 CHRONIC LEFT SHOULDER PAIN: Primary | ICD-10-CM

## 2024-07-19 DIAGNOSIS — M25.512 CHRONIC LEFT SHOULDER PAIN: Primary | ICD-10-CM

## 2024-07-19 PROCEDURE — 97112 NEUROMUSCULAR REEDUCATION: CPT

## 2024-07-19 PROCEDURE — 97110 THERAPEUTIC EXERCISES: CPT

## 2024-07-19 PROCEDURE — 97140 MANUAL THERAPY 1/> REGIONS: CPT

## 2024-07-19 NOTE — PROGRESS NOTES
"Daily Note     Today's date: 2024  Patient name: Evelyn Ortiz  : 1955  MRN: [id number]  Referring provider: Luis Antonio Sanabria MD  Dx:   Encounter Diagnosis     ICD-10-CM    1. Chronic left shoulder pain  M25.512     G89.29       2. Neck stiffness  M43.6                          Subjective: \"It's feeling better\". Planning on doing water aerobics today and swimming tomorrow.       Objective: See treatment diary below      Assessment: Tolerated treatment well. Patient demonstrates tenderness in posterior shoulder though symptoms improved following STM. Patient able to progress with resistance and banded periscapular strengthening exercises. Improved lower trap activation during overhead exercises. Patient would benefit from continued PT to address remaining functional deficits.     Problem List:  1) hypomobility cervicothoracic spine - repeated motions, thoracic mobility exercises  2) decreased neuromuscular control L UE - STM, functional periscapular training     Evelyn Ortiz is a pleasant 68 y.o. female who presents with complaints of left shoulder pain while performing breast stroke during swimming. She has tightness in left subscapularis and teres major, hypomobility of cervical spine, and slight increase in neural tension on left UE resulting in inability to perform functional activities and recreation such as swimming.  No further referral appears necessary at this time based upon examination results.  I expect she will benefit from physical therapy to address functional limitations.        Comparable signs:  1) breast stroke  2) STM subscapularis and teres major  Understanding of Dx/Px/POC: good     Prognosis: good    Goals  Short Term Goals: to be achieved by 4 weeks  1) Patient to be independent with basic HEP.  2) Decrease pain to 5/10 at its worst.  3) Increase cervical spine ROM by 5-10 degrees in all deficient planes.  4) Increase UE strength by 1/2 MMT grade in all deficient planes.  5) " "Improve joint mobility in cervical spine to normal   6) Patient to return to swimming for 10 min without limited to pain.      Long Term Goals: to be achieved by discharge  1) FOTO equal to or greater than 74.  2) Patient to be independent with comprehensive HEP.  3) Cervical spine ROM WNL all planes to improve a/iadls.  4) Increase UE strength to 1 MMT grade in all planes to improve a/iadls.  5) Lifting is improved to maximal level of function  6) Patient to return to swimming for 30 min without limited to pain.       Plan: Progress treatment as tolerated.       Precautions: osteoporosis/osteopenia       Manuals 7/9 7/11 7/15 7/19         STM subscap, teres major, pec minor CS CS CS supine and side lying  CS posterior capsule                                                 Neuro Re-Ed             Banded B ER HEP gtb Gtb 2x10 Gtb 3x10  Gtb 3x10         Supine cerv retr lift and hold             Cheerleaders  Gtb 2x10  Gtb 3x10           Rows   nv  17# 2x10          Lat pulldown  nv  2x10 13#                                   Ther Ex             Seated prayer stretch HEP 10x10\"  10x10\"  10x10\"          Functional wall slides  HEP 2x10x5\" 2x10x5\"  2x10x3\"          LT OH activation  2x10 otb  3x5\" otb  3x5\" gtb         UBE - activity tolerance   2'/2' 2'/2' 2'/2'         FR thoracic ext   15x5\"  20x5\"          Pt education CS            Open book  X10 B X10 B           Wall angels    2x5x3\"          Ther Activity                                       Gait Training                                       Modalities                                            "

## 2024-07-23 ENCOUNTER — OFFICE VISIT (OUTPATIENT)
Dept: PHYSICAL THERAPY | Facility: CLINIC | Age: 69
End: 2024-07-23
Payer: MEDICARE

## 2024-07-23 DIAGNOSIS — G89.29 CHRONIC LEFT SHOULDER PAIN: Primary | ICD-10-CM

## 2024-07-23 DIAGNOSIS — M43.6 NECK STIFFNESS: ICD-10-CM

## 2024-07-23 DIAGNOSIS — M25.512 CHRONIC LEFT SHOULDER PAIN: Primary | ICD-10-CM

## 2024-07-23 PROCEDURE — 97140 MANUAL THERAPY 1/> REGIONS: CPT

## 2024-07-23 PROCEDURE — 97110 THERAPEUTIC EXERCISES: CPT

## 2024-07-23 PROCEDURE — 97112 NEUROMUSCULAR REEDUCATION: CPT

## 2024-07-23 NOTE — PROGRESS NOTES
Daily Note     Today's date: 2024  Patient name: Evelyn Ortiz  : 1955  MRN: [id number]  Referring provider: Luis Antonio Sanabria MD  Dx:   Encounter Diagnosis     ICD-10-CM    1. Chronic left shoulder pain  M25.512     G89.29       2. Neck stiffness  M43.6                            Subjective: Patient reports shoulder is feeling better, but hasn't swam since last session. Going to visit grandchildren next for the rest of the week and will try to swim next week.       Objective: See treatment diary below      Assessment: Tolerated treatment well. Patient demonstrates improvements in shoulder mobility and symptoms following STM of posterior shoulder and subscapularis. Continued to work on thoracic mobility with patient demonstrating good form throughout. Encouraged patient to continue with exercises at home and verbalized understanding. Patient would benefit from continued PT to address remaining functional deficits.     Problem List:  1) hypomobility cervicothoracic spine - repeated motions, thoracic mobility exercises  2) decreased neuromuscular control L UE - STM, functional periscapular training     Evelyn Ortiz is a pleasant 68 y.o. female who presents with complaints of left shoulder pain while performing breast stroke during swimming. She has tightness in left subscapularis and teres major, hypomobility of cervical spine, and slight increase in neural tension on left UE resulting in inability to perform functional activities and recreation such as swimming.  No further referral appears necessary at this time based upon examination results.  I expect she will benefit from physical therapy to address functional limitations.        Comparable signs:  1) breast stroke  2) STM subscapularis and teres major  Understanding of Dx/Px/POC: good     Prognosis: good    Goals  Short Term Goals: to be achieved by 4 weeks  1) Patient to be independent with basic HEP.  2) Decrease pain to 5/10 at its worst.  3)  "Increase cervical spine ROM by 5-10 degrees in all deficient planes.  4) Increase UE strength by 1/2 MMT grade in all deficient planes.  5) Improve joint mobility in cervical spine to normal   6) Patient to return to swimming for 10 min without limited to pain.      Long Term Goals: to be achieved by discharge  1) FOTO equal to or greater than 74.  2) Patient to be independent with comprehensive HEP.  3) Cervical spine ROM WNL all planes to improve a/iadls.  4) Increase UE strength to 1 MMT grade in all planes to improve a/iadls.  5) Lifting is improved to maximal level of function  6) Patient to return to swimming for 30 min without limited to pain.       Plan: Progress treatment as tolerated.       Precautions: osteoporosis/osteopenia       Manuals 7/9 7/11 7/15 7/19 7/23        STM subscap, teres major, pec minor CS CS CS supine and side lying  CS posterior capsule  CS posterior capsule                                                Neuro Re-Ed             Banded B ER HEP gtb Gtb 2x10 Gtb 3x10  Gtb 3x10 Btb 3x10         Supine cerv retr lift and hold             Cheerleaders  Gtb 2x10  Gtb 3x10           Rows   nv  17# 2x10  18# 2x10         Lat pulldown  nv  2x10 13# 2x10 14#                                   Ther Ex             Seated prayer stretch HEP 10x10\"  10x10\"  10x10\"  10x10\"         Functional wall slides  HEP 2x10x5\" 2x10x5\"  2x10x3\"  2x10x3\"         LT OH activation  2x10 otb  3x5\" otb  3x5\" gtb Gtb 2x10         UBE - activity tolerance   2'/2' 2'/2' 2'/2' 2'/2'        FR thoracic ext   15x5\"  20x5\"  20x5\"         Pt education CS            Open book  X10 B X10 B           Wall angels    2x5x3\"  5x3\"         Foam roll protocol     2', 10x ea bear hugs, scissors, angels         Ther Activity                                       Gait Training                                       Modalities                                            "

## 2024-07-24 ENCOUNTER — APPOINTMENT (OUTPATIENT)
Dept: PHYSICAL THERAPY | Facility: CLINIC | Age: 69
End: 2024-07-24
Payer: MEDICARE

## 2024-07-31 ENCOUNTER — OFFICE VISIT (OUTPATIENT)
Dept: PHYSICAL THERAPY | Facility: CLINIC | Age: 69
End: 2024-07-31
Payer: MEDICARE

## 2024-07-31 DIAGNOSIS — G89.29 CHRONIC LEFT SHOULDER PAIN: Primary | ICD-10-CM

## 2024-07-31 DIAGNOSIS — M43.6 NECK STIFFNESS: ICD-10-CM

## 2024-07-31 DIAGNOSIS — M25.512 CHRONIC LEFT SHOULDER PAIN: Primary | ICD-10-CM

## 2024-07-31 PROCEDURE — 97112 NEUROMUSCULAR REEDUCATION: CPT

## 2024-07-31 PROCEDURE — 97140 MANUAL THERAPY 1/> REGIONS: CPT

## 2024-07-31 PROCEDURE — 97110 THERAPEUTIC EXERCISES: CPT

## 2024-07-31 NOTE — PROGRESS NOTES
Daily Note     Today's date: 2024  Patient name: Evelyn Ortiz  : 1955  MRN: [id number]  Referring provider: Luis Antonio Sanabria MD  Dx:   Encounter Diagnosis     ICD-10-CM    1. Chronic left shoulder pain  M25.512     G89.29       2. Neck stiffness  M43.6                              Subjective: Patient reports she did stretch lab on Monday and feels like it was helpful. Continues to try stretching at home, tired from babysitting grandchildren. Hasn't tried swimming but plans to try today.       Objective: See treatment diary below      Assessment: Tolerated treatment well. Noted decrease in posterior shoulder tissue tension compared to previous sessions. Patient demonstrated increased fatigue during today's session due to busy weekend. Demonstrates good form with exercises. Patient would benefit from continued PT to address remaining functional deficits.     Problem List:  1) hypomobility cervicothoracic spine - repeated motions, thoracic mobility exercises  2) decreased neuromuscular control L UE - STM, functional periscapular training     Evelyn Ortiz is a pleasant 68 y.o. female who presents with complaints of left shoulder pain while performing breast stroke during swimming. She has tightness in left subscapularis and teres major, hypomobility of cervical spine, and slight increase in neural tension on left UE resulting in inability to perform functional activities and recreation such as swimming.  No further referral appears necessary at this time based upon examination results.  I expect she will benefit from physical therapy to address functional limitations.        Comparable signs:  1) breast stroke  2) STM subscapularis and teres major  Understanding of Dx/Px/POC: good     Prognosis: good    Goals  Short Term Goals: to be achieved by 4 weeks  1) Patient to be independent with basic HEP.  2) Decrease pain to 5/10 at its worst.  3) Increase cervical spine ROM by 5-10 degrees in all deficient  "planes.  4) Increase UE strength by 1/2 MMT grade in all deficient planes.  5) Improve joint mobility in cervical spine to normal   6) Patient to return to swimming for 10 min without limited to pain.      Long Term Goals: to be achieved by discharge  1) FOTO equal to or greater than 74.  2) Patient to be independent with comprehensive HEP.  3) Cervical spine ROM WNL all planes to improve a/iadls.  4) Increase UE strength to 1 MMT grade in all planes to improve a/iadls.  5) Lifting is improved to maximal level of function  6) Patient to return to swimming for 30 min without limited to pain.       Plan: Progress treatment as tolerated.       Precautions: osteoporosis/osteopenia          Manuals 7/9 7/11 7/15 7/19 7/23 7/31       STM subscap, teres major, pec minor CS CS CS supine and side lying  CS posterior capsule  CS posterior capsule  CS posterior capsule                                               Neuro Re-Ed             Banded B ER HEP gtb Gtb 2x10 Gtb 3x10  Gtb 3x10 Btb 3x10  Btb 3x10        Supine cerv retr lift and hold             Cheerleaders  Gtb 2x10  Gtb 3x10    Btb 3x10        Rows   nv  17# 2x10  18# 2x10         Lat pulldown  nv  2x10 13# 2x10 14#                                   Ther Ex             Seated prayer stretch HEP 10x10\"  10x10\"  10x10\"  10x10\"         Functional wall slides  HEP 2x10x5\" 2x10x5\"  2x10x3\"  2x10x3\"  2x10x3\"        LT OH activation  2x10 otb  3x5\" otb  3x5\" gtb Gtb 2x10         UBE - activity tolerance   2'/2' 2'/2' 2'/2' 2'/2' 2'/2'       FR thoracic ext   15x5\"  20x5\"  20x5\"         Pt education CS            Open book  X10 B X10 B           Wall angels    2x5x3\"  5x3\"  8x3\"        Foam roll protocol     2', 10x ea bear hugs, scissors, angels  2', 15x ea chin tuck, bear hugs, scissors, angels        Ther Activity                                       Gait Training                                       Modalities                                            "

## 2024-08-02 ENCOUNTER — OFFICE VISIT (OUTPATIENT)
Dept: PHYSICAL THERAPY | Facility: CLINIC | Age: 69
End: 2024-08-02
Payer: MEDICARE

## 2024-08-02 DIAGNOSIS — M43.6 NECK STIFFNESS: ICD-10-CM

## 2024-08-02 DIAGNOSIS — G89.29 CHRONIC LEFT SHOULDER PAIN: Primary | ICD-10-CM

## 2024-08-02 DIAGNOSIS — M25.512 CHRONIC LEFT SHOULDER PAIN: Primary | ICD-10-CM

## 2024-08-02 PROCEDURE — 97112 NEUROMUSCULAR REEDUCATION: CPT

## 2024-08-02 PROCEDURE — 97110 THERAPEUTIC EXERCISES: CPT

## 2024-08-02 PROCEDURE — 97140 MANUAL THERAPY 1/> REGIONS: CPT

## 2024-08-02 NOTE — PROGRESS NOTES
Daily Note     Today's date: 2024  Patient name: Evelyn Ortiz  : 1955  MRN: [id number]  Referring provider: Luis Antonio Sanabria MD  Dx:   Encounter Diagnosis     ICD-10-CM    1. Chronic left shoulder pain  M25.512     G89.29       2. Neck stiffness  M43.6                                Subjective: Patient didn't swim since last session but did water aerobics and was fine, plans to swim tomorrow. Plans to be done with therapy if swimming goes well.       Objective: See treatment diary below      Assessment: Tolerated treatment well. Patient had decreased symptoms with STM during today's session. Able to progress with periscapular strengthening without adverse reaction. Good form noted with exercises. Patient would benefit from continued PT to address remaining functional deficits.     Problem List:  1) hypomobility cervicothoracic spine - repeated motions, thoracic mobility exercises  2) decreased neuromuscular control L UE - STM, functional periscapular training     Evelyn Ortiz is a pleasant 68 y.o. female who presents with complaints of left shoulder pain while performing breast stroke during swimming. She has tightness in left subscapularis and teres major, hypomobility of cervical spine, and slight increase in neural tension on left UE resulting in inability to perform functional activities and recreation such as swimming.  No further referral appears necessary at this time based upon examination results.  I expect she will benefit from physical therapy to address functional limitations.        Comparable signs:  1) breast stroke  2) STM subscapularis and teres major  Understanding of Dx/Px/POC: good     Prognosis: good    Goals  Short Term Goals: to be achieved by 4 weeks  1) Patient to be independent with basic HEP.  2) Decrease pain to 5/10 at its worst.  3) Increase cervical spine ROM by 5-10 degrees in all deficient planes.  4) Increase UE strength by 1/2 MMT grade in all deficient planes.  5)  "Improve joint mobility in cervical spine to normal   6) Patient to return to swimming for 10 min without limited to pain.      Long Term Goals: to be achieved by discharge  1) FOTO equal to or greater than 74.  2) Patient to be independent with comprehensive HEP.  3) Cervical spine ROM WNL all planes to improve a/iadls.  4) Increase UE strength to 1 MMT grade in all planes to improve a/iadls.  5) Lifting is improved to maximal level of function  6) Patient to return to swimming for 30 min without limited to pain.       Plan: Progress treatment as tolerated.  Assess for d/c next week.     Precautions: osteoporosis/osteopenia          Manuals 7/9 7/11 7/15 7/19 7/23 7/31 8/2      STM subscap, teres major, pec minor CS CS CS supine and side lying  CS posterior capsule  CS posterior capsule  CS posterior capsule  CS posterior capsule                                              Neuro Re-Ed             Banded B ER HEP gtb Gtb 2x10 Gtb 3x10  Gtb 3x10 Btb 3x10  Btb 3x10  Btb 2x15      Supine cerv retr lift and hold             Cheerleaders  Gtb 2x10  Gtb 3x10    Btb 3x10  Btb 2x15      Rows   nv  17# 2x10  18# 2x10   19# 2x10       Lat pulldown  nv  2x10 13# 2x10 14#   2x10 15#                                 Ther Ex             Seated prayer stretch HEP 10x10\"  10x10\"  10x10\"  10x10\"         Functional wall slides  HEP 2x10x5\" 2x10x5\"  2x10x3\"  2x10x3\"  2x10x3\"  2x10x3\"       LT OH activation  2x10 otb  3x5\" otb  3x5\" gtb Gtb 2x10   Btb 2x10      UBE - activity tolerance   2'/2' 2'/2' 2'/2' 2'/2' 2'/2' 2'/2'      FR thoracic ext   15x5\"  20x5\"  20x5\"         Pt education CS            Open book  X10 B X10 B           Wall angels    2x5x3\"  5x3\"  8x3\"  10x3\"       Foam roll protocol     2', 10x ea bear hugs, scissors, angels  2', 15x ea chin tuck, bear hugs, scissors, angels  2', 15x ea chin tuck, bear hugs, scissors, angels       Ther Activity                                       Gait Training                             "           Modalities

## 2024-08-05 ENCOUNTER — OFFICE VISIT (OUTPATIENT)
Dept: PHYSICAL THERAPY | Facility: CLINIC | Age: 69
End: 2024-08-05
Payer: MEDICARE

## 2024-08-05 DIAGNOSIS — G89.29 CHRONIC LEFT SHOULDER PAIN: Primary | ICD-10-CM

## 2024-08-05 DIAGNOSIS — M25.512 CHRONIC LEFT SHOULDER PAIN: Primary | ICD-10-CM

## 2024-08-05 DIAGNOSIS — M43.6 NECK STIFFNESS: ICD-10-CM

## 2024-08-05 PROCEDURE — 97140 MANUAL THERAPY 1/> REGIONS: CPT

## 2024-08-05 PROCEDURE — 97112 NEUROMUSCULAR REEDUCATION: CPT

## 2024-08-05 PROCEDURE — 97110 THERAPEUTIC EXERCISES: CPT

## 2024-08-05 NOTE — PROGRESS NOTES
Daily Note     Today's date: 2024  Patient name: Evelyn Ortiz  : 1955  MRN: [id number]  Referring provider: Luis Antonio Sanabria MD  Dx:   Encounter Diagnosis     ICD-10-CM    1. Chronic left shoulder pain  M25.512     G89.29       2. Neck stiffness  M43.6                                  Subjective: Patient reports she swam and though she could still feel mild discomfort and felt much better. Felt more of the pain on the top of her shoulder.       Objective: See treatment diary below      Assessment: Tolerated treatment well. Patient had mild improvements with seated cervical retr with extension, with further improvement following STM of posterior capsule and RTC muscles. Patient fatigued quickly with periscapular strengthening though had good response to FR exercises, bought own foam roller to continue with this exercise at home. Patient would benefit from continued PT to address remaining functional deficits.     Problem List:  1) hypomobility cervicothoracic spine - repeated motions, thoracic mobility exercises  2) decreased neuromuscular control L UE - STM, functional periscapular training     Evelyn Ortiz is a pleasant 68 y.o. female who presents with complaints of left shoulder pain while performing breast stroke during swimming. She has tightness in left subscapularis and teres major, hypomobility of cervical spine, and slight increase in neural tension on left UE resulting in inability to perform functional activities and recreation such as swimming.  No further referral appears necessary at this time based upon examination results.  I expect she will benefit from physical therapy to address functional limitations.        Comparable signs:  1) breast stroke  2) STM subscapularis and teres major  Understanding of Dx/Px/POC: good     Prognosis: good    Goals  Short Term Goals: to be achieved by 4 weeks  1) Patient to be independent with basic HEP.  2) Decrease pain to 5/10 at its worst.  3) Increase  "cervical spine ROM by 5-10 degrees in all deficient planes.  4) Increase UE strength by 1/2 MMT grade in all deficient planes.  5) Improve joint mobility in cervical spine to normal   6) Patient to return to swimming for 10 min without limited to pain.      Long Term Goals: to be achieved by discharge  1) FOTO equal to or greater than 74.  2) Patient to be independent with comprehensive HEP.  3) Cervical spine ROM WNL all planes to improve a/iadls.  4) Increase UE strength to 1 MMT grade in all planes to improve a/iadls.  5) Lifting is improved to maximal level of function  6) Patient to return to swimming for 30 min without limited to pain.       Plan: Progress treatment as tolerated.  Assess for d/c next week.     Precautions: osteoporosis/osteopenia          Manuals 7/9 7/11 7/15 7/19 7/23 7/31 8/2 8/5     STM subscap, teres major, pec minor CS CS CS supine and side lying  CS posterior capsule  CS posterior capsule  CS posterior capsule  CS posterior capsule  CS posterior capsule                                             Neuro Re-Ed             Banded B ER HEP gtb Gtb 2x10 Gtb 3x10  Gtb 3x10 Btb 3x10  Btb 3x10  Btb 2x15      Supine cerv retr lift and hold             Cheerleaders  Gtb 2x10  Gtb 3x10    Btb 3x10  Btb 2x15      Rows   nv  17# 2x10  18# 2x10   19# 2x10  3x10 19#      Lat pulldown  nv  2x10 13# 2x10 14#   2x10 15#  2x10 15#                                Ther Ex             Seated prayer stretch HEP 10x10\"  10x10\"  10x10\"  10x10\"         Functional wall slides  HEP 2x10x5\" 2x10x5\"  2x10x3\"  2x10x3\"  2x10x3\"  2x10x3\"  2x10x3\"      LT OH activation  2x10 otb  3x5\" otb  3x5\" gtb Gtb 2x10   Btb 2x10      UBE - activity tolerance   2'/2' 2'/2' 2'/2' 2'/2' 2'/2' 2'/2' 2'/2'     FR thoracic ext   15x5\"  20x5\"  20x5\"         Pt education CS            Open book  X10 B X10 B           Wall angels    2x5x3\"  5x3\"  8x3\"  10x3\"  10x3\"      Foam roll protocol     2', 10x ea bear hugs, scissors, angels  2', 15x " ea chin tuck, bear hugs, livier, angels  2', 15x ea chin tuck, bear hugs, livier, angels  2', 15x ea chin tuck, bear hugs, scissors, angels      Seated cervical retr c ext        2x10      Ther Activity                                       Gait Training                                       Modalities

## 2024-08-08 ENCOUNTER — OFFICE VISIT (OUTPATIENT)
Dept: PHYSICAL THERAPY | Facility: CLINIC | Age: 69
End: 2024-08-08
Payer: MEDICARE

## 2024-08-08 DIAGNOSIS — M25.512 CHRONIC LEFT SHOULDER PAIN: Primary | ICD-10-CM

## 2024-08-08 DIAGNOSIS — M43.6 NECK STIFFNESS: ICD-10-CM

## 2024-08-08 DIAGNOSIS — G89.29 CHRONIC LEFT SHOULDER PAIN: Primary | ICD-10-CM

## 2024-08-08 PROCEDURE — 97110 THERAPEUTIC EXERCISES: CPT

## 2024-08-08 PROCEDURE — 97140 MANUAL THERAPY 1/> REGIONS: CPT

## 2024-08-08 NOTE — PROGRESS NOTES
Discharge Summary    Today's date: 2024  Patient name: Evelyn Ortiz  : 1955  MRN: [id number]  Referring provider: Luis Antonio Sanabria MD  Dx:   Encounter Diagnosis     ICD-10-CM    1. Chronic left shoulder pain  M25.512     G89.29       2. Neck stiffness  M43.6                                    Subjective: Patient reports 75% improvement since beginning therapy. Notices improvement in ability to perform recreational activities like swimming. Notes ability to manage symptoms with HEP. Current pain = 0/10. Best pain = 0/10. Worst pain = 1/10. Feels like she's good to be discharged and will continue with exercises at home.       Objective: See treatment diary below    Active Range of Motion   Cervical/Thoracic Spine       Cervical    Flexion:  Restriction level: minimal  Extension:  Restriction level: minimal  Left lateral flexion:  Restriction level: minimal  Right lateral flexion:  Restriction level minimal  Left rotation:  Restriction level: minimal  Right rotation:  Restriction level: minimal  Mechanical Assessment    Cervical      Thoracic    Seated extension: repeated movements  Pain location: no change    Lumbar      Strength/Myotome Testing   Cervical Spine     Left   Interossei strength (t1): 5    Right   Interossei strength (t1): 5    Left Shoulder     Planes of Motion   Flexion: 5   Abduction: 5   External rotation at 0°: 4+   Internal rotation at 0°: 4+     Right Shoulder     Planes of Motion   Flexion: 5   Abduction: 5   External rotation at 0°: 4+   Internal rotation at 0°: 4+     Left Elbow   Flexion: 5  Extension: 5    Right Elbow   Flexion: 5  Extension: 5    Tests     Additional Tests Details  Mild neural tension on left but not reproductive of patient chief complaints    General Comments:    Upper quarter screen   Shoulder: unremarkable      Assessment: Tolerated treatment well. Patient has demonstrated significant improvement in symptoms and functional mobility following formal physical  therapy, evidenced by ability to return to swimming. Patient has increased left shoulder flexion with decreased tissue tension and tenderness in subscapularis and teres major compared to at the beginning of treatment. Patient demonstrates good form and understanding with HEP and is able to manage symptoms at home. At this time, patient is appropriate and agreeable to discharge. Patient provided with updated HEP and advised to call with any questions/concerns, verbalizes understanding.      Problem List:  1) hypomobility cervicothoracic spine - repeated motions, thoracic mobility exercises  2) decreased neuromuscular control L UE - STM, functional periscapular training     Evelyn Ortiz is a pleasant 68 y.o. female who presents with complaints of left shoulder pain while performing breast stroke during swimming. She has tightness in left subscapularis and teres major, hypomobility of cervical spine, and slight increase in neural tension on left UE resulting in inability to perform functional activities and recreation such as swimming.  No further referral appears necessary at this time based upon examination results.  I expect she will benefit from physical therapy to address functional limitations.        Comparable signs:  1) breast stroke  2) STM subscapularis and teres major  Understanding of Dx/Px/POC: good     Prognosis: good    Goals  Short Term Goals: to be achieved by 4 weeks  1) Patient to be independent with basic HEP.  2) Decrease pain to 5/10 at its worst.  3) Increase cervical spine ROM by 5-10 degrees in all deficient planes.  4) Increase UE strength by 1/2 MMT grade in all deficient planes.  5) Improve joint mobility in cervical spine to normal   6) Patient to return to swimming for 10 min without limited to pain.      Long Term Goals: to be achieved by discharge  1) FOTO equal to or greater than 74.  2) Patient to be independent with comprehensive HEP.  3) Cervical spine ROM WNL all planes to improve  "a/iadls.  4) Increase UE strength to 1 MMT grade in all planes to improve a/iadls.  5) Lifting is improved to maximal level of function  6) Patient to return to swimming for 30 min without limited to pain.       Plan: Discharge POC.      Precautions: osteoporosis/osteopenia          Manuals 7/9 7/11 7/15 7/19 7/23 7/31 8/2 8/5 8/8    STM subscap, teres major, pec minor CS CS CS supine and side lying  CS posterior capsule  CS posterior capsule  CS posterior capsule  CS posterior capsule  CS posterior capsule  CS posterior capsule and UT                                            Neuro Re-Ed             Banded B ER HEP gtb Gtb 2x10 Gtb 3x10  Gtb 3x10 Btb 3x10  Btb 3x10  Btb 2x15      Supine cerv retr lift and hold             Cheerleaders  Gtb 2x10  Gtb 3x10    Btb 3x10  Btb 2x15      Rows   nv  17# 2x10  18# 2x10   19# 2x10  3x10 19#      Lat pulldown  nv  2x10 13# 2x10 14#   2x10 15#  2x10 15#                                Ther Ex             Seated prayer stretch HEP 10x10\"  10x10\"  10x10\"  10x10\"         Functional wall slides  HEP 2x10x5\" 2x10x5\"  2x10x3\"  2x10x3\"  2x10x3\"  2x10x3\"  2x10x3\"  10x3\"    LT OH activation  2x10 otb  3x5\" otb  3x5\" gtb Gtb 2x10   Btb 2x10      UBE - activity tolerance   2'/2' 2'/2' 2'/2' 2'/2' 2'/2' 2'/2' 2'/2' 2'/2'    FR thoracic ext   15x5\"  20x5\"  20x5\"         Pt education CS            Open book  X10 B X10 B           Wall angels    2x5x3\"  5x3\"  8x3\"  10x3\"  10x3\"  10x3\"     Foam roll protocol     2', 10x ea bear hugs, scissors, angels  2', 15x ea chin tuck, bear hugs, scissors, angels  2', 15x ea chin tuck, bear hugs, scissors, angels  2', 15x ea chin tuck, bear hugs, scissors, angels  2', 10x ea chin tuck, bear hugs, scissors, angels     Seated cervical retr c ext        2x10      Ther Activity                                       Gait Training                                       Modalities                                            "

## 2024-10-23 ENCOUNTER — TELEPHONE (OUTPATIENT)
Age: 69
End: 2024-10-23

## 2024-10-23 NOTE — TELEPHONE ENCOUNTER
Called patient and explained, with symptoms I do need to reschedule her. Patient asked to be placed on waitlist and apt was schedule. I did offer patient appointment with olayinka, she declined.

## 2024-10-23 NOTE — TELEPHONE ENCOUNTER
When Maki from the pod called office to explain about the patient having covid she stated the following. Patient has covid it is day one and she is having the symptom of body aches. After speaking with jami I asked her to forward the message to me. Because if she is having any symptoms we need to reschedule. Calling patient to explain.

## 2024-10-23 NOTE — TELEPHONE ENCOUNTER
Pt   tested Positive  for Covid  and  and wants to know if can still come into appointment tomorrow 10/24  Per  Toby from office send message  to Claire

## 2024-12-26 ENCOUNTER — ANNUAL EXAM (OUTPATIENT)
Dept: GYNECOLOGY | Facility: CLINIC | Age: 69
End: 2024-12-26
Payer: MEDICARE

## 2024-12-26 VITALS
HEIGHT: 61 IN | BODY MASS INDEX: 29.07 KG/M2 | SYSTOLIC BLOOD PRESSURE: 124 MMHG | DIASTOLIC BLOOD PRESSURE: 78 MMHG | WEIGHT: 154 LBS

## 2024-12-26 DIAGNOSIS — Z91.89 HISTORY OF DES EXPOSURE IN UTERO: ICD-10-CM

## 2024-12-26 DIAGNOSIS — N95.2 ATROPHY OF VAGINA: ICD-10-CM

## 2024-12-26 DIAGNOSIS — Z01.419 WOMEN'S ANNUAL ROUTINE GYNECOLOGICAL EXAMINATION: Primary | ICD-10-CM

## 2024-12-26 DIAGNOSIS — M81.0 OSTEOPOROSIS, UNSPECIFIED OSTEOPOROSIS TYPE, UNSPECIFIED PATHOLOGICAL FRACTURE PRESENCE: ICD-10-CM

## 2024-12-26 DIAGNOSIS — Z12.31 ENCOUNTER FOR SCREENING MAMMOGRAM FOR BREAST CANCER: ICD-10-CM

## 2024-12-26 PROCEDURE — G0101 CA SCREEN;PELVIC/BREAST EXAM: HCPCS | Performed by: OBSTETRICS & GYNECOLOGY

## 2024-12-26 PROCEDURE — G0145 SCR C/V CYTO,THINLAYER,RESCR: HCPCS | Performed by: OBSTETRICS & GYNECOLOGY

## 2024-12-26 RX ORDER — LORATADINE 10 MG/1
1 TABLET ORAL DAILY
COMMUNITY
Start: 2024-11-19

## 2024-12-26 RX ORDER — ZOLPIDEM TARTRATE 12.5 MG/1
TABLET, FILM COATED, EXTENDED RELEASE ORAL
COMMUNITY

## 2024-12-26 RX ORDER — FLUTICASONE PROPIONATE 50 MCG
SPRAY, SUSPENSION (ML) NASAL
COMMUNITY
Start: 2024-12-19

## 2024-12-26 RX ORDER — AZELASTINE HYDROCHLORIDE 137 UG/1
SPRAY, METERED NASAL
COMMUNITY
Start: 2024-11-19

## 2024-12-26 NOTE — PROGRESS NOTES
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    Atrophy of vagina    Osteoporosis, unspecified osteoporosis type, unspecified pathological fracture presence    Other orders  -     zolpidem (AMBIEN CR) 12.5 MG CR tablet; TAKE 1 TABLET BY MOUTH NIGHTLY IF NEEDED FOR SLEEP  -     metFORMIN (GLUCOPHAGE) 500 mg tablet; TAKE 1 TABLET BY MOUTH IN THE MORNING FOR 1 WEEK THEN INCREASE TO 1 TAB TWICE A DAY  -     loratadine (CLARITIN) 10 mg tablet; Take 1 tablet by mouth in the morning  -     fluticasone (FLONASE) 50 mcg/act nasal spray; spray 2 sprays into each nostril every day  -     Azelastine HCl 137 MCG/SPRAY SOLN; SPRAY 1-2 SPRAYS INTO EACH NOSTRIL TWICE A DAY AS DIRECTED    1.  Yearly exam-Pap smear done with reflex HPV, self breast awareness reviewed, calcium/vitamin D recommendations discussed, mammogram request given, colonoscopy follow-up as per specialist.  2.  Mild vaginal atrophy-noted on exam.  She does use lubrication with good results.  Vaginal lubrication/moisturizer sheet given, to use accordingly.    3. history of 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-Pap done with reflex HPV.  4.  History of osteoporosis/osteopenia-has follow-up with DEXA scan tomorrow.  Continues to follow-up with Dr. Stockton of endocrinology at [facility].  Previously was on Reclast, now on Prolia for the last 3 years.  Improvement has been noted.  Calcium, vitamin D, weightbearing exercise recommended.  5. status post left knee replacement-done 2023 with good results.  Right knee was replaced 12/7/2022.  6.  Other-retired.  Had questions about semaglutide which were answered to the best my ability.  She will follow-up with Dr. Voss in 10 days time with him.  Follow-up 1 year for 1 year follow-up or as needed.      Subjective   Patient ID: Evelyn Ortiz is a 69 y.o. female.    Vitals:    12/26/24 1251    BP: 124/78     Patient was seen today for yearly exam.  Please see assessment plan for details.      The following portions of the patient's history were reviewed and updated as appropriate: allergies, current medications, past family history, past medical history, past social history, past surgical history, and problem list.  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     Osteoporosis     Reflux gastritis     Urinary tract infection     Vitamin D deficiency      Past Surgical History:   Procedure Laterality Date    CERVICAL CERCLAGE      with both pregnancies    COLPOSCOPY  2005    ENDOMETRIAL BIOPSY      HALLUX VALGUS CORRECTION      HAND SURGERY      INDUCED       LAPAROSCOPY      for infertility evaluation    REDUCTION MAMMAPLASTY      WISDOM TOOTH EXTRACTION       OB History    Para Term  AB Living        2   SAB IAB Ectopic Multiple Live Births              Current Outpatient Medications:     amphetamine-dextroamphetamine (ADDERALL XR) 30 MG 24 hr capsule, Take 30 mg by mouth, Disp: , Rfl:     ARIPiprazole (ABILIFY) 5 mg tablet, Take 5 mg by mouth daily, Disp: , Rfl:     FLUoxetine (PROzac) 40 MG capsule, Take 40 mg by mouth, Disp: , Rfl:     pantoprazole (PROTONIX) 40 mg tablet, pantoprazole 40 mg tablet,delayed release  take 1 tablet by mouth once daily, Disp: , Rfl:     ARIPiprazole (ABILIFY) 1 mg/mL oral solution, Abilify, Disp: , Rfl:     betamethasone acetate-betamethasone sodium phosphate (CELESTONE) 6 (3-3) mg/mL, 1 mL, Disp: , Rfl:     Calcium Carb-Cholecalciferol 600-800 MG-UNIT TABS, Take by mouth (Patient not taking: Reported on 10/7/2021), Disp: , Rfl:     cephalexin (KEFLEX) 500 mg capsule, TAKE 1 CAPSULE BY MOUTH 2 TIMES A DAY FOR 5 DAYS., Disp: , Rfl:     clindamycin (CLEOCIN T) 1 % lotion, APPLY A THIN LAYER TO THE AFFECTED AREAS TWICE A DAY, Disp: , Rfl:      Diclofenac Sodium (VOLTAREN) 1 %, apply 2 grams topically to affected area four times a day, Disp: , Rfl:     ergocalciferol (VITAMIN D2) 50,000 units, Take 50,000 Units by mouth, Disp: , Rfl:     gabapentin (NEURONTIN) 300 mg capsule, Take 300 mg by mouth 2 (two) times a day, Disp: , Rfl:     Influenza Virus Vacc Split PF 0.5 ML SRIDEVI, Fluvirin [id number](PF) 45 mcg (15 mcg x3)/0.5 mL intramuscular syringe  inject 0.5 milliliter intramuscularly, Disp: , Rfl:     lidocaine (XYLOCAINE) 1 %, 3 mL, Disp: , Rfl:     meloxicam (MOBIC) 15 mg tablet, Take 15 mg by mouth daily, Disp: , Rfl:     nirmatrelvir & ritonavir (Paxlovid) tablet therapy pack, take 2 NIRMATRELVIR tablets with 1 RITONAVIR tablet twice a day for 5 days, Disp: , Rfl:     omeprazole (PriLOSEC) 20 mg delayed release capsule, , Disp: , Rfl:     predniSONE 10 mg tablet, take 4 tablet by mouth once daily for 7 days then take 3 tablet b...  (REFER TO PRESCRIPTION NOTES)., Disp: , Rfl:     topiramate (TOPAMAX) 25 mg sprinkle capsule, Take 25 mg by mouth 2 (two) times a day, Disp: , Rfl:     triamcinolone acetonide (Zilretta) 32 MG, 32 mL, Disp: , Rfl:     zoledronic acid (RECLAST) 5 mg/100 mL IV infusion (premix), Infuse 5 mg/100 mL into a venous catheter (Patient not taking: Reported on 10/7/2021), Disp: , Rfl:   Allergies   Allergen Reactions    Demerol [Meperidine] Other (See Comments)     hypotension     Social History     Socioeconomic History    Marital status: /Civil Union     Spouse name: Not on file    Number of children: Not on file    Years of education: Not on file    Highest education level: Not on file   Occupational History    Not on file   Tobacco Use    Smoking status: Former     Current packs/day: 0.00     Types: Cigarettes     Start date:      Quit date:      Years since quittin.0    Smokeless tobacco: Never    Tobacco comments:     Smoked 1 or 2 cigarettes a day   Vaping Use    Vaping status: Former    Start date:  1/1/2021    Quit date: 12/1/2022    Substances: THC   Substance and Sexual Activity    Alcohol use: No    Drug use: Yes     Types: Marijuana     Comment: medical marijuana capsules    Sexual activity: Yes     Partners: Male   Other Topics Concern    Not on file   Social History Narrative    Not on file     Social Drivers of Health     Financial Resource Strain: Low Risk  (9/11/2023)    [facility]    Overall Financial Resource Strain (CARDIA)     Difficulty of Paying Living Expenses: Not hard at all   Food Insecurity: No Food Insecurity (9/11/2023)    [facility]    Hunger Vital Sign     Worried About Running Out of Food in the Last Year: Never true     Ran Out of Food in the Last Year: Never true   Transportation Needs: No Transportation Needs (9/11/2023)    [facility]    PRAPARE - Transportation     Lack of Transportation (Medical): No     Lack of Transportation (Non-Medical): No   Physical Activity: Unknown (9/11/2023)    Received from [facility]    Exercise Vital Sign     Days of Exercise per Week: 0 days     Minutes of Exercise per Session: Not on file   Stress: No Stress Concern Present (9/11/2023)    [facility]    [facility] - Occupational Stress Questionnaire     Feeling of Stress : Not at all   Social Connections: Socially Integrated (9/11/2023)    [facility]    Social Connection and Isolation Panel [NHANES]     Frequency of Communication with Friends and Family: More than three times a week     Frequency of Social Gatherings with Friends and Family: Three times a week     Attends [employer]ologist Services: 1 to 4 times per year     Active Member of Clubs or  Organizations: Yes     Attends Club or Organization Meetings: More than 4 times per year     Marital Status:    Intimate Partner Violence: Not At Risk (9/11/2023)    [facility]    Humiliation, Afraid, Rape, and Kick questionnaire     Fear of Current or Ex-Partner: No     Emotionally Abused: No     Physically Abused: No     Sexually Abused: No   Housing Stability: Low Risk  (9/11/2023)    [facility]    Housing Stability Vital Sign     Unable to Pay for Housing in the Last Year: No     Number of Places Lived in the Last Year: 1     Unstable Housing in the Last Year: No     Family History   Problem Relation Age of Onset    Hypertension Mother     Lung cancer Father     Diabetes Neg Hx     Heart disease Neg Hx     Stroke Neg Hx     Thyroid disease Neg Hx        Review of Systems   Constitutional:  Negative for chills, diaphoresis, fatigue and fever.   Respiratory:  Negative for apnea, cough, chest tightness, shortness of breath and wheezing.    Cardiovascular:  Negative for chest pain, palpitations and leg swelling.   Gastrointestinal:  Negative for abdominal distention, abdominal pain, anal bleeding, constipation, diarrhea, nausea, rectal pain and vomiting.   Genitourinary:  Negative for difficulty urinating, dyspareunia, dysuria, frequency, hematuria, menstrual problem, pelvic pain, urgency, vaginal bleeding, vaginal discharge and vaginal pain.   Musculoskeletal:  Negative for arthralgias, back pain and myalgias.   Skin:  Negative for color change and rash.   Neurological:  Negative for dizziness, syncope, light-headedness, numbness and headaches.   Hematological:  Negative for adenopathy. Does not bruise/bleed easily.   Psychiatric/Behavioral:  Negative for dysphoric mood and sleep disturbance. The patient is not nervous/anxious.        Objective   Physical Exam  OBGyn Exam     Objective  "     /78 (BP Location: Left arm, Patient Position: Sitting)   Ht 5' 1.25\" (1.556 m)   Wt 69.9 kg (154 lb)   BMI 28.86 kg/m²     General:   alert and oriented, in no acute distress   Neck: normal to inspection and palpation   Breast: normal appearance, no masses or tenderness   Heart:    Lungs:    Abdomen: soft, non-tender, without masses or organomegaly   Vulva: normal   Vagina: Without erythema or lesions or discharge..  Mildly atrophic   Cervix: Without lesions or discharge or cervicitis.  No Cervical motion tenderness.  Mildly atrophic   Uterus: top normal size, anteverted, non-tender   Adnexa: no mass, fullness, tenderness   Rectum: negative    Psych:  Normal mood and affect   Skin:  Without obvious lesions   Eyes: symmetric, with normal movements and reactivity   Musculoskeletal:  Normal muscle tone and movements appreciated       "

## 2024-12-31 ENCOUNTER — RESULTS FOLLOW-UP (OUTPATIENT)
Dept: GYNECOLOGY | Facility: CLINIC | Age: 69
End: 2024-12-31

## 2024-12-31 LAB
LAB AP GYN PRIMARY INTERPRETATION: NORMAL
Lab: NORMAL